# Patient Record
Sex: FEMALE | Race: WHITE | NOT HISPANIC OR LATINO | Employment: FULL TIME
[De-identification: names, ages, dates, MRNs, and addresses within clinical notes are randomized per-mention and may not be internally consistent; named-entity substitution may affect disease eponyms.]

---

## 2017-12-15 ENCOUNTER — RECORDS - HEALTHEAST (OUTPATIENT)
Dept: ADMINISTRATIVE | Facility: OTHER | Age: 32
End: 2017-12-15

## 2017-12-15 ENCOUNTER — OFFICE VISIT - HEALTHEAST (OUTPATIENT)
Dept: FAMILY MEDICINE | Facility: CLINIC | Age: 32
End: 2017-12-15

## 2017-12-15 DIAGNOSIS — J06.9 UPPER RESPIRATORY INFECTION: ICD-10-CM

## 2017-12-15 DIAGNOSIS — J02.9 SORE THROAT: ICD-10-CM

## 2021-05-31 VITALS — WEIGHT: 213 LBS | BODY MASS INDEX: 33.36 KG/M2

## 2021-06-14 NOTE — PROGRESS NOTES
Name: Nevaeh Slaughter  Age: 32 y.o.  Gender: female  : 1985  Date of Encounter: 12/15/2017      HPI:  Nevaeh Slaughter is a 32 y.o.  female who presents to the clinic with concerns of upper respiratory infection.  Reports symptoms started 2 days earlier with fatigue, runny nose, sore throat and productive cough.  Denies fever, chills, ear pain, face pain, face pressure, wheezing, shortness of breath, nausea, vomiting, abdominal pain, diarrhea and skin rash.  Patient lives with her fiancé who is currently not ill.  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    Current Medication:   Medications reviewed and updated.  No current outpatient prescriptions on file.    Past Med / Surg History:  No past medical history on file.  No past surgical history on file.    Fam / Soc History:  No family history on file.  Social History     Social History     Marital status: Single     Spouse name: N/A     Number of children: N/A     Years of education: N/A     Occupational History     Not on file.     Social History Main Topics     Smoking status: Never Smoker     Smokeless tobacco: Not on file     Alcohol use Not on file     Drug use: Not on file     Sexual activity: Not on file     Other Topics Concern     Not on file     Social History Narrative     No narrative on file       ROS:  14 point review of systems unremarkable except as mentioned in HPI    Objective:  Vitals: /74 (Patient Site: Left Arm, Patient Position: Sitting, Cuff Size: Adult Large)  Pulse (!) 102  Temp 98.2  F (36.8  C) (Tympanic)   Wt 213 lb (96.6 kg)  SpO2 97%    Gen: Alert, awake, well appearing  HEENT: NC, AT,   Ears:  Ear canals clear.  TMs normal appearing.  Eyes:  EOMI.  Pupils equally round and reactive to light. Conjunctivae clear.  Sclera non-icteric.  Nose:  Nasal mucosa boggy with clear rhinorrhea, septum midline.  No sinus tenderness  Mouth:  MMM. Oropharynx  erythematous. No tonsillar exudate. Teeth, gum, tongue and lips clear.  Neck:  Supple, FROM, No lymphandenpathy, No TM  Heart: Regular rate and rhythm; normal S1 and S2; no murmurs, gallops, or rubs.  Peripheral Vessels: Normal pulses and perfusion.  Lungs: Unlabored respirations; symmetric chest expansion; clear breath sounds.  Extremities: No clubbing, cyanosis, or edema. Normal upper and lower extremities.  Skin: Normal turgor and without lesions or rashes.  Mental Status: Alert, oriented, in no distress. Mood and affect appropriate.    Pertinent results / imaging:  Results for orders placed or performed in visit on 12/15/17   Rapid Strep A Screen-Throat   Result Value Ref Range    Rapid Strep A Antigen No Group A Strep detected, presumptive negative No Group A Strep detected, presumptive negative       Assessment:  Upper respiratory infection     Plan: Reviewed with patient during clinic visit negative RST and informed follow-up PCR strep test is pending.  If follow-up PCR strep test is negative most probably viral illness.  Informed she will be contacted if the test is positive.  Advised fluids, rest, acetaminophen or ibuprofen for fever or discomfort, humidified air, sleeping with head elevated, salt water gargles, over-the-counter throat lozenges and other medications for symptom control.  Reviewed expected course, duration of symptoms and reasons to return for reevaluation.  Patient voiced understanding and was in agreement with plan.    Alejandrina Esteban MD  12/15/2017

## 2022-01-14 ENCOUNTER — E-VISIT (OUTPATIENT)
Dept: URGENT CARE | Facility: URGENT CARE | Age: 37
End: 2022-01-14
Payer: COMMERCIAL

## 2022-01-14 DIAGNOSIS — J01.90 ACUTE BACTERIAL SINUSITIS: Primary | ICD-10-CM

## 2022-01-14 DIAGNOSIS — B96.89 ACUTE BACTERIAL SINUSITIS: Primary | ICD-10-CM

## 2022-01-14 PROCEDURE — 99421 OL DIG E/M SVC 5-10 MIN: CPT | Performed by: PHYSICIAN ASSISTANT

## 2022-01-14 NOTE — PATIENT INSTRUCTIONS
Dear Nevaeh Marshall    After reviewing your responses, I've been able to diagnose you with?a sinus infection caused by bacteria.?     Based on your responses and diagnosis, I have prescribed augmentin to treat your symptoms. I have sent this to your pharmacy.?     It is also important to stay well hydrated, get lots of rest and take over-the-counter decongestants,?tylenol?or ibuprofen if you?are able to?take those medications per your primary care provider to help relieve discomfort.?     It is important that you take?all of?your prescribed medication even if your symptoms are improving after a few doses.? Taking?all of?your medicine helps prevent the symptoms from returning.?     If your symptoms worsen, you develop severe headache, vomiting, high fever (>102), or are not improving in 7 days, please contact your primary care provider for an appointment or visit any of our convenient Walk-in Care or Urgent Care Centers to be seen which can be found on our website?here.?     Thanks again for choosing?us?as your health care partner,?   ?  Niles June PA-C?

## 2022-01-24 ENCOUNTER — E-VISIT (OUTPATIENT)
Dept: URGENT CARE | Facility: CLINIC | Age: 37
End: 2022-01-24
Payer: COMMERCIAL

## 2022-01-24 DIAGNOSIS — J34.89 SINUS PAIN: Primary | ICD-10-CM

## 2022-01-24 NOTE — PATIENT INSTRUCTIONS
Dear Nevaeh Marshall,    We are sorry you are not feeling well. Based on the responses you provided, it is recommended that you be seen in-person in urgent care so we can better evaluate your symptoms. Please click here to find the nearest urgent care location to you.   You will not be charged for this Visit. Thank you for trusting us with your care.    Linda Mai MD

## 2022-03-13 ENCOUNTER — HEALTH MAINTENANCE LETTER (OUTPATIENT)
Age: 37
End: 2022-03-13

## 2022-04-08 ENCOUNTER — OFFICE VISIT (OUTPATIENT)
Dept: FAMILY MEDICINE | Facility: CLINIC | Age: 37
End: 2022-04-08
Payer: COMMERCIAL

## 2022-04-08 VITALS
WEIGHT: 240 LBS | DIASTOLIC BLOOD PRESSURE: 90 MMHG | HEIGHT: 67 IN | TEMPERATURE: 98.1 F | BODY MASS INDEX: 37.67 KG/M2 | SYSTOLIC BLOOD PRESSURE: 122 MMHG | RESPIRATION RATE: 18 BRPM | OXYGEN SATURATION: 97 % | HEART RATE: 90 BPM

## 2022-04-08 DIAGNOSIS — F41.9 ANXIETY: Primary | ICD-10-CM

## 2022-04-08 LAB
ERYTHROCYTE [DISTWIDTH] IN BLOOD BY AUTOMATED COUNT: 13.1 % (ref 10–15)
HCT VFR BLD AUTO: 43.7 % (ref 35–47)
HGB BLD-MCNC: 14.4 G/DL (ref 11.7–15.7)
MCH RBC QN AUTO: 28.1 PG (ref 26.5–33)
MCHC RBC AUTO-ENTMCNC: 33 G/DL (ref 31.5–36.5)
MCV RBC AUTO: 85 FL (ref 78–100)
PLATELET # BLD AUTO: 209 10E3/UL (ref 150–450)
RBC # BLD AUTO: 5.12 10E6/UL (ref 3.8–5.2)
WBC # BLD AUTO: 5.1 10E3/UL (ref 4–11)

## 2022-04-08 PROCEDURE — 80053 COMPREHEN METABOLIC PANEL: CPT | Performed by: PHYSICIAN ASSISTANT

## 2022-04-08 PROCEDURE — 99204 OFFICE O/P NEW MOD 45 MIN: CPT | Performed by: PHYSICIAN ASSISTANT

## 2022-04-08 PROCEDURE — 84443 ASSAY THYROID STIM HORMONE: CPT | Performed by: PHYSICIAN ASSISTANT

## 2022-04-08 PROCEDURE — 36415 COLL VENOUS BLD VENIPUNCTURE: CPT | Performed by: PHYSICIAN ASSISTANT

## 2022-04-08 PROCEDURE — 85027 COMPLETE CBC AUTOMATED: CPT | Performed by: PHYSICIAN ASSISTANT

## 2022-04-08 RX ORDER — ESCITALOPRAM OXALATE 10 MG/1
10 TABLET ORAL DAILY
Qty: 30 TABLET | Refills: 1 | Status: SHIPPED | OUTPATIENT
Start: 2022-04-08 | End: 2022-04-27

## 2022-04-08 ASSESSMENT — ENCOUNTER SYMPTOMS: NERVOUS/ANXIOUS: 1

## 2022-04-08 NOTE — PROGRESS NOTES
"  Assessment & Plan     Anxiety  Will start taking in am, 1/2 tablets for 4-6 days until no side effects noted then increase to whole tab and follow-up in 1 month    - Comprehensive metabolic panel  - TSH with free T4 reflex  - CBC with platelets  - escitalopram (LEXAPRO) 10 MG tablet; Take 1 tablet (10 mg) by mouth daily      BMI:   Estimated body mass index is 37.59 kg/m  as calculated from the following:    Height as of this encounter: 1.702 m (5' 7\").    Weight as of this encounter: 108.9 kg (240 lb).           Return in about 3 weeks (around 4/29/2022) for depression/anxiety recheck.    Ramona Ann Aaseby-Aguilera, PA-C  [facility] CRISTINA Nair is a 36 year old who presents for the following health issues     Anxiety    History of Present Illness       Hypertension: She presents for follow up of hypertension.  She does not check blood pressure  regularly outside of the clinic. Outpatient blood pressures have not been over 140/90. She does not follow a low salt diet.     Reason for visit:  Wenesday I started to feel lighted headed. And feeling really anxious.  Symptom onset:  1-3 days ago    She eats 0-1 servings of fruits and vegetables daily.She consumes 2 sweetened beverage(s) daily.She exercises with enough effort to increase her heart rate 9 or less minutes per day.  She exercises with enough effort to increase her heart rate 3 or less days per week.   She is taking medications regularly.             Review of Systems   Psychiatric/Behavioral: The patient is nervous/anxious.             Objective    BP (!) 122/90   Pulse 90   Temp 98.1  F (36.7  C) (Oral)   Resp 18   Ht 1.702 m (5' 7\")   Wt 108.9 kg (240 lb)   LMP 03/21/2022 (Approximate)   SpO2 97%   BMI 37.59 kg/m    Body mass index is 37.59 kg/m .  Physical Exam   GENERAL: healthy, alert and no distress  HENT: ear canals and TM's normal, nose and mouth without ulcers or lesions  RESP: lungs clear to auscultation - " no rales, rhonchi or wheezes  CV: regular rate and rhythm, normal S1 S2, no S3 or S4, no murmur, click or rub, no peripheral edema and peripheral pulses strong  PSYCH: mentation appears normal, affect normal/bright

## 2022-04-08 NOTE — PATIENT INSTRUCTIONS
(F41.9) Anxiety  (primary encounter diagnosis)  Comment: ? Etiology but most likley anxiety.  Well check thyroid and other labs and determine plan.  Follow-up 3-4 weeks   Plan: Comprehensive metabolic panel, TSH with free T4        reflex, CBC with platelets, escitalopram         (LEXAPRO) 10 MG tablet              Patient Education     Treating Anxiety Disorders with Medicine  An anxiety disorder can make you feel nervous or apprehensive, even without a clear reason. In people age 65 and older, generalized anxiety disorder is one of the most commonly diagnosed anxiety disorders. Many times it occurs with depression. Certain anxiety disorders can cause intense feelings of fear or panic. You may even have physical symptoms such as a racing heartbeat, sweating, or dizziness. If you have these feelings, you don t have to suffer anymore. Treatment to help you overcome your fears will likely include therapy (also called counseling). Medicine may also be prescribed to help control your symptoms.     Medicines  Certain medicines may be prescribed to help control your symptoms. So you may feel less anxious. You may also feel able to move forward with therapy. At first, medicines and dosages may need to be adjusted to find what works best for you. Try to be patient. Tell your healthcare provider how a medicine makes you feel. This way, you can work together to find the treatment that s best for you. Keep in mind that medicines can have side effects. Talk with your provider about any side effects that are bothering you. Changing the dose or type of medicine may help. Don t stop taking medicine on your own. That can cause symptoms to come back or cause dangerous withdrawal symptoms.     Anti-anxiety medicine. This medicine eases symptoms and helps you relax. Your healthcare provider will explain when and how to use it. It may be prescribed for use before situations that make you anxious. You may also be told to take medicine  on a regular schedule. Anti-anxiety medicine may make you feel a little sleepy or  out of it.  Don t drive a car or operate machinery while on this medicine, until you know how it affects you.  Never use alcohol or other drugs with anti-anxiety medicines. This could result in loss of muscular control, sedation, coma, or death. Also, use only the amount of medicine prescribed for you. If you think you may have taken too much, get emergency care right away. Never share your medicines with others. Store these medicines in a safe place that can't be reached by children or visitors.   Keep taking medicines as prescribed  Never change your dosage, share or use another person's medicine, or stop taking your medicines without talking to your healthcare provider first. Keep the following in mind:     Some medicines must be taken on a schedule. Make this part of your daily routine. For instance, always take your pill before brushing your teeth. A pillbox can help you remember if you ve taken your medicine each day.    Medicines are often taken for 6 to 12 months. Your healthcare provider will then evaluate whether you need to stay on them. Many people who have also had therapy may no longer need medicine to manage anxiety.    You may need to stop taking medicine slowly to give your body time to adjust. When it s time to stop, your healthcare provider will tell you more. Remember: Never stop taking your medicine without talking to your provider first.    If symptoms return, you may need to start taking medicines again.  This isn t your fault. It s just the nature of your anxiety disorder.  What to think about    Side effects. Medicines may cause side effects. Ask your healthcare provider or pharmacist what you can expect. They may have ideas for avoiding some side effects.    Sexual problems. Some antidepressants can affect your desire for sex or your ability to have an orgasm. A change in dosage or medicine often solves the  problem. If you have a sexual side effect that concerns you, tell your healthcare provider.    Addiction. If you ve never had a problem with drugs or alcohol, you may not have a problem with medicines used to treat anxiety disorders. But always discuss the medicines with your healthcare provider before taking them. If you have a history of addiction, you may not be able to use certain medicines used to treat anxiety disorders.    Medicine interactions. Always check with your pharmacist before using any over-the-counter medicines (OTCs), including herbal supplements. Some OTCs may interact with your anti-anxiety medicines and increase or decrease their effectiveness.    [employer] last reviewed this educational content on 12/1/2019 2000-2021 [employer]. All rights reserved. This information is not intended as a substitute for professional medical care. Always follow your healthcare professional's instructions.           Patient Education     Lexapro Oral Tablet 10 mg  Uses  This medicine is used for the following purposes:    anxiety    depression    eating disorders    menopausal symptoms    obsessive compulsive disorder    post-traumatic stress disorder  Instructions  This medicine may be taken with or without food.  It is very important that you take the medicine at about the same time every day. It will work best if you do this.  Keep the medicine at room temperature. Avoid heat and direct light.  It may take several weeks for this medicine to fully work.  It is important that you keep taking each dose of this medicine on time even if you are feeling well.  If you forget to take a dose on time, take it as soon as you remember. If it is almost time for the next dose, do not take the missed dose. Return to your normal dosing schedule. Do not take 2 doses of this medicine at one time.  Please tell your doctor and pharmacist about all the medicines you take. Include both prescription and  over-the-counter medicines. Also tell them about any vitamins, herbal medicines, or anything else you take for your health.  Do not suddenly stop taking this medicine. Check with your doctor before stopping.  Cautions  Tell your doctor and pharmacist if you ever had an allergic reaction to a medicine. Symptoms of an allergic reaction can include trouble breathing, skin rash, itching, swelling, or severe dizziness.  Do not use the medication any more than instructed.  Your ability to stay alert or to react quickly may be impaired by this medicine. Do not drive or operate machinery until you know how this medicine will affect you.  Do not drink beverages with alcohol while on this medicine.  Family should check on the patient often. Call the doctor if patient becomes more depressed, has thoughts of suicide, or shows changes in behavior.  Call the doctor if there are any signs of confusion or unusual changes in behavior.  Tell the doctor or pharmacist if you are pregnant, planning to be pregnant, or breastfeeding.  Ask your pharmacist if this medicine can interact with any of your other medicines. Be sure to tell them about all the medicines you take.  Do not start or stop any other medicines without first speaking to your doctor or pharmacist.  Do not share this medicine with anyone who has not been prescribed this medicine.  This medicine can cause serious side effects in some patients. Important information from the [place] Food and Drug Administration (FDA) is available from your pharmacist. Please review it carefully with your pharmacist to understand the risks associated with this medicine.  Side Effects  The following is a list of some common side effects from this medicine. Please speak with your doctor about what you should do if you experience these or other side effects.    agitated feeling or trouble sleeping    constipation    dizziness    drowsiness or sedation    dry mouth    lack of energy and  tiredness    nausea    stomach upset or abdominal pain    sweating    vomiting  Call your doctor or get medical help right away if you notice any of these more serious side effects:    bleeding that is severe or takes longer to stop    confusion    severe or persistent constipation    pain in the eye    fainting    hallucinations (unusual thoughts, seeing or hearing things that are not real)    fast or irregular heart beats    muscle aches, spasms or abnormal movements    muscle weakness    dilation of the pupils    problems with sexual functions or desire    blood in stool    dark, tarry stool    blurring or changes of vision    severe or persistent vomiting  A few people may have an allergic reactions to this medicine. Symptoms can include difficulty breathing, skin rash, itching, swelling, or severe dizziness. If you notice any of these symptoms, seek medical help quickly.  Extra  Please speak with your doctor, nurse, or pharmacist if you have any questions about this medicine.  [email or web address]  IMPORTANT NOTE: This document tells you briefly how to take your medicine, but it does not tell you all there is to know about it.Your doctor or pharmacist may give you other documents about your medicine. Please talk to them if you have any questions.Always follow their advice. There is a more complete description of this medicine available in English.Scan this code on your smartphone or tablet or use the web address below. You can also ask your pharmacist for a printout. If you have any questions, please ask your pharmacist.     2021 [employer].

## 2022-04-09 LAB
ALBUMIN SERPL-MCNC: 3.9 G/DL (ref 3.4–5)
ALP SERPL-CCNC: 101 U/L (ref 40–150)
ALT SERPL W P-5'-P-CCNC: 45 U/L (ref 0–50)
ANION GAP SERPL CALCULATED.3IONS-SCNC: 5 MMOL/L (ref 3–14)
AST SERPL W P-5'-P-CCNC: 21 U/L (ref 0–45)
BILIRUB SERPL-MCNC: 0.2 MG/DL (ref 0.2–1.3)
BUN SERPL-MCNC: 10 MG/DL (ref 7–30)
CALCIUM SERPL-MCNC: 9.4 MG/DL (ref 8.5–10.1)
CHLORIDE BLD-SCNC: 107 MMOL/L (ref 94–109)
CO2 SERPL-SCNC: 25 MMOL/L (ref 20–32)
CREAT SERPL-MCNC: 0.79 MG/DL (ref 0.52–1.04)
GFR SERPL CREATININE-BSD FRML MDRD: >90 ML/MIN/1.73M2
GLUCOSE BLD-MCNC: 91 MG/DL (ref 70–99)
POTASSIUM BLD-SCNC: 3.9 MMOL/L (ref 3.4–5.3)
PROT SERPL-MCNC: 8 G/DL (ref 6.8–8.8)
SODIUM SERPL-SCNC: 137 MMOL/L (ref 133–144)
TSH SERPL DL<=0.005 MIU/L-ACNC: 1.15 MU/L (ref 0.4–4)

## 2022-04-27 ENCOUNTER — OFFICE VISIT (OUTPATIENT)
Dept: FAMILY MEDICINE | Facility: CLINIC | Age: 37
End: 2022-04-27
Payer: COMMERCIAL

## 2022-04-27 VITALS
SYSTOLIC BLOOD PRESSURE: 132 MMHG | TEMPERATURE: 98.2 F | DIASTOLIC BLOOD PRESSURE: 93 MMHG | RESPIRATION RATE: 14 BRPM | HEIGHT: 67 IN | HEART RATE: 86 BPM | BODY MASS INDEX: 37.51 KG/M2 | WEIGHT: 239 LBS

## 2022-04-27 DIAGNOSIS — Z13.220 SCREENING, LIPID: ICD-10-CM

## 2022-04-27 DIAGNOSIS — Z11.59 NEED FOR HEPATITIS C SCREENING TEST: ICD-10-CM

## 2022-04-27 DIAGNOSIS — F41.9 ANXIETY: Primary | ICD-10-CM

## 2022-04-27 LAB
CHOLEST SERPL-MCNC: 200 MG/DL
FASTING STATUS PATIENT QL REPORTED: ABNORMAL
HDLC SERPL-MCNC: 51 MG/DL
LDLC SERPL CALC-MCNC: 127 MG/DL
NONHDLC SERPL-MCNC: 149 MG/DL
TRIGL SERPL-MCNC: 111 MG/DL

## 2022-04-27 PROCEDURE — 86803 HEPATITIS C AB TEST: CPT | Performed by: PHYSICIAN ASSISTANT

## 2022-04-27 PROCEDURE — 99213 OFFICE O/P EST LOW 20 MIN: CPT | Performed by: PHYSICIAN ASSISTANT

## 2022-04-27 PROCEDURE — 36415 COLL VENOUS BLD VENIPUNCTURE: CPT | Performed by: PHYSICIAN ASSISTANT

## 2022-04-27 PROCEDURE — 80061 LIPID PANEL: CPT | Performed by: PHYSICIAN ASSISTANT

## 2022-04-27 ASSESSMENT — ANXIETY QUESTIONNAIRES
GAD7 TOTAL SCORE: 1
2. NOT BEING ABLE TO STOP OR CONTROL WORRYING: NOT AT ALL
7. FEELING AFRAID AS IF SOMETHING AWFUL MIGHT HAPPEN: NOT AT ALL
GAD7 TOTAL SCORE: 1
1. FEELING NERVOUS, ANXIOUS, OR ON EDGE: NOT AT ALL
7. FEELING AFRAID AS IF SOMETHING AWFUL MIGHT HAPPEN: NOT AT ALL
GAD7 TOTAL SCORE: 1
3. WORRYING TOO MUCH ABOUT DIFFERENT THINGS: NOT AT ALL
6. BECOMING EASILY ANNOYED OR IRRITABLE: SEVERAL DAYS
5. BEING SO RESTLESS THAT IT IS HARD TO SIT STILL: NOT AT ALL
4. TROUBLE RELAXING: NOT AT ALL

## 2022-04-27 NOTE — PROGRESS NOTES
"  Assessment & Plan     Anxiety  Did not start on lexapro but made healthy life style choices and feels this is helping. Advised follow-up for physical in 1-2 months and well readdress then.     Need for hepatitis C screening test    - Hepatitis C Screen Reflex to HCV RNA Quant and Genotype    Screening, lipid    - Lipid Profile (Chol, Trig, HDL, LDL calc)             BMI:   Estimated body mass index is 37.43 kg/m  as calculated from the following:    Height as of this encounter: 1.702 m (5' 7\").    Weight as of this encounter: 108.4 kg (239 lb).   Weight management plan: Discussed healthy diet and exercise guidelines        Return in about 2 months (around 6/27/2022) for Physical Exam, PAP.    Ramona Ann Aaseby-Aguilera, PA-C  [facility]    Parveen Nair is a 36 year old who presents for the following health issues         Was seen for anxiety 1 month ago and was prescribed lexapro.  She states she did not end up taking it and has been making life style changes like exercise and eating healthy and this has been helping.   History of Present Illness       Mental Health Follow-up:  Patient presents to follow-up on Anxiety.    Patient's anxiety since last visit has been:  Better  The patient is not having other symptoms associated with anxiety.  Any significant life events: No and job concerns  Patient is not feeling anxious or having panic attacks.  Patient has no concerns about alcohol or drug use.         Today's PEDRO-7 Score: 1    Hypertension: She presents for follow up of hypertension.  She does not check blood pressure  regularly outside of the clinic. Outpatient blood pressures have not been over 140/90. She follows a low salt diet.     Reason for visit:  Follow up appt from paul week ago.  Symptom onset:  1-2 weeks ago  Symptom intensity:  Mild  Symptom progression:  Improving  Had these symptoms before:  No    She eats 0-1 servings of fruits and vegetables daily.She consumes 0 " "sweetened beverage(s) daily.She exercises with enough effort to increase her heart rate 9 or less minutes per day.  She exercises with enough effort to increase her heart rate 3 or less days per week.   She is taking medications regularly.           Review of Systems   Constitutional, HEENT, cardiovascular, pulmonary, gi and gu systems are negative, except as otherwise noted.      Objective    BP (!) 132/93 (BP Location: Right arm, Patient Position: Chair, Cuff Size: Adult Large)   Pulse 86   Temp 98.2  F (36.8  C) (Oral)   Resp 14   Ht 1.702 m (5' 7\")   Wt 108.4 kg (239 lb)   LMP 03/21/2022 (Approximate)   Breastfeeding No   BMI 37.43 kg/m    Body mass index is 37.43 kg/m .  Physical Exam   GENERAL: healthy, alert and no distress  PSYCH: mentation appears normal, affect normal/bright                "

## 2022-04-28 LAB — HCV AB SERPL QL IA: NONREACTIVE

## 2022-04-28 ASSESSMENT — ANXIETY QUESTIONNAIRES: GAD7 TOTAL SCORE: 1

## 2022-06-30 SDOH — ECONOMIC STABILITY: INCOME INSECURITY: HOW HARD IS IT FOR YOU TO PAY FOR THE VERY BASICS LIKE FOOD, HOUSING, MEDICAL CARE, AND HEATING?: NOT HARD AT ALL

## 2022-06-30 SDOH — ECONOMIC STABILITY: INCOME INSECURITY: IN THE LAST 12 MONTHS, WAS THERE A TIME WHEN YOU WERE NOT ABLE TO PAY THE MORTGAGE OR RENT ON TIME?: NO

## 2022-06-30 SDOH — ECONOMIC STABILITY: TRANSPORTATION INSECURITY
IN THE PAST 12 MONTHS, HAS LACK OF TRANSPORTATION KEPT YOU FROM MEETINGS, WORK, OR FROM GETTING THINGS NEEDED FOR DAILY LIVING?: NO

## 2022-06-30 SDOH — ECONOMIC STABILITY: FOOD INSECURITY: WITHIN THE PAST 12 MONTHS, THE FOOD YOU BOUGHT JUST DIDN'T LAST AND YOU DIDN'T HAVE MONEY TO GET MORE.: NEVER TRUE

## 2022-06-30 SDOH — HEALTH STABILITY: PHYSICAL HEALTH: ON AVERAGE, HOW MANY MINUTES DO YOU ENGAGE IN EXERCISE AT THIS LEVEL?: 10 MIN

## 2022-06-30 SDOH — ECONOMIC STABILITY: FOOD INSECURITY: WITHIN THE PAST 12 MONTHS, YOU WORRIED THAT YOUR FOOD WOULD RUN OUT BEFORE YOU GOT MONEY TO BUY MORE.: NEVER TRUE

## 2022-06-30 SDOH — ECONOMIC STABILITY: TRANSPORTATION INSECURITY
IN THE PAST 12 MONTHS, HAS THE LACK OF TRANSPORTATION KEPT YOU FROM MEDICAL APPOINTMENTS OR FROM GETTING MEDICATIONS?: NO

## 2022-06-30 SDOH — HEALTH STABILITY: PHYSICAL HEALTH: ON AVERAGE, HOW MANY DAYS PER WEEK DO YOU ENGAGE IN MODERATE TO STRENUOUS EXERCISE (LIKE A BRISK WALK)?: 3 DAYS

## 2022-06-30 ASSESSMENT — ENCOUNTER SYMPTOMS
PARESTHESIAS: 0
DIARRHEA: 0
NERVOUS/ANXIOUS: 0
SHORTNESS OF BREATH: 0
FREQUENCY: 0
SORE THROAT: 0
DIZZINESS: 0
COUGH: 0
CHILLS: 0
EYE PAIN: 0
PALPITATIONS: 0
MYALGIAS: 0
FEVER: 0
BREAST MASS: 0
WEAKNESS: 0
HEADACHES: 0
ARTHRALGIAS: 0
HEMATURIA: 0
ABDOMINAL PAIN: 0
HEARTBURN: 0
NAUSEA: 0
HEMATOCHEZIA: 0
JOINT SWELLING: 0
DYSURIA: 0
CONSTIPATION: 0

## 2022-06-30 ASSESSMENT — LIFESTYLE VARIABLES
AUDIT-C TOTAL SCORE: 1
SKIP TO QUESTIONS 9-10: 1
HOW OFTEN DO YOU HAVE A DRINK CONTAINING ALCOHOL: MONTHLY OR LESS
HOW MANY STANDARD DRINKS CONTAINING ALCOHOL DO YOU HAVE ON A TYPICAL DAY: 1 OR 2
HOW OFTEN DO YOU HAVE SIX OR MORE DRINKS ON ONE OCCASION: NEVER

## 2022-06-30 ASSESSMENT — SOCIAL DETERMINANTS OF HEALTH (SDOH)
IN A TYPICAL WEEK, HOW MANY TIMES DO YOU TALK ON THE PHONE WITH FAMILY, FRIENDS, OR NEIGHBORS?: MORE THAN THREE TIMES A WEEK
HOW OFTEN DO YOU ATTEND CHURCH OR RELIGIOUS SERVICES?: NEVER
HOW OFTEN DO YOU GET TOGETHER WITH FRIENDS OR RELATIVES?: ONCE A WEEK
DO YOU BELONG TO ANY CLUBS OR ORGANIZATIONS SUCH AS CHURCH GROUPS UNIONS, FRATERNAL OR ATHLETIC GROUPS, OR SCHOOL GROUPS?: NO

## 2022-07-01 ENCOUNTER — OFFICE VISIT (OUTPATIENT)
Dept: FAMILY MEDICINE | Facility: CLINIC | Age: 37
End: 2022-07-01
Payer: COMMERCIAL

## 2022-07-01 VITALS
DIASTOLIC BLOOD PRESSURE: 92 MMHG | HEART RATE: 99 BPM | SYSTOLIC BLOOD PRESSURE: 144 MMHG | WEIGHT: 234 LBS | HEIGHT: 66 IN | RESPIRATION RATE: 14 BRPM | TEMPERATURE: 97.3 F | BODY MASS INDEX: 37.61 KG/M2

## 2022-07-01 DIAGNOSIS — Z12.4 CERVICAL CANCER SCREENING: ICD-10-CM

## 2022-07-01 DIAGNOSIS — I10 ESSENTIAL HYPERTENSION: Primary | ICD-10-CM

## 2022-07-01 DIAGNOSIS — Z00.00 ROUTINE GENERAL MEDICAL EXAMINATION AT A HEALTH CARE FACILITY: ICD-10-CM

## 2022-07-01 PROCEDURE — 99395 PREV VISIT EST AGE 18-39: CPT | Performed by: PHYSICIAN ASSISTANT

## 2022-07-01 PROCEDURE — 87624 HPV HI-RISK TYP POOLED RSLT: CPT | Performed by: PHYSICIAN ASSISTANT

## 2022-07-01 PROCEDURE — G0145 SCR C/V CYTO,THINLAYER,RESCR: HCPCS | Performed by: PHYSICIAN ASSISTANT

## 2022-07-01 PROCEDURE — 99214 OFFICE O/P EST MOD 30 MIN: CPT | Mod: 25 | Performed by: PHYSICIAN ASSISTANT

## 2022-07-01 RX ORDER — LISINOPRIL 10 MG/1
10 TABLET ORAL DAILY
Qty: 30 TABLET | Refills: 1 | Status: SHIPPED | OUTPATIENT
Start: 2022-07-01 | End: 2022-07-29

## 2022-07-01 ASSESSMENT — ENCOUNTER SYMPTOMS
HEARTBURN: 0
SHORTNESS OF BREATH: 0
BREAST MASS: 0
JOINT SWELLING: 0
PARESTHESIAS: 0
DYSURIA: 0
HEADACHES: 0
COUGH: 0
CONSTIPATION: 0
EYE PAIN: 0
ABDOMINAL PAIN: 0
PALPITATIONS: 0
DIZZINESS: 0
FEVER: 0
SORE THROAT: 0
MYALGIAS: 0
HEMATOCHEZIA: 0
WEAKNESS: 0
ARTHRALGIAS: 0
DIARRHEA: 0
FREQUENCY: 0
HEMATURIA: 0
NAUSEA: 0
CHILLS: 0
NERVOUS/ANXIOUS: 0

## 2022-07-01 NOTE — PROGRESS NOTES
SUBJECTIVE:   CC: Nevaeh Marshall is an 36 year old woman who presents for preventive health visit.       Patient has been advised of split billing requirements and indicates understanding: Yes  Healthy Habits:     Getting at least 3 servings of Calcium per day:  Yes    Bi-annual eye exam:  Yes    Dental care twice a year:  Yes    Sleep apnea or symptoms of sleep apnea:  None    Diet:  Carbohydrate counting    Frequency of exercise:  None    Taking medications regularly:  Yes    Medication side effects:  None    PHQ-2 Total Score: 0    Additional concerns today:  No          Today's PHQ-2 Score:   PHQ-2 ( 1999 [employer]) 6/30/2022   Q1: Little interest or pleasure in doing things 0   Q2: Feeling down, depressed or hopeless 0   PHQ-2 Score 0   Q1: Little interest or pleasure in doing things Not at all   Q2: Feeling down, depressed or hopeless Not at all   PHQ-2 Score 0       Abuse: Current or Past (Physical, Sexual or Emotional) - No  Do you feel safe in your environment? Yes    Have you ever done Advance Care Planning? (For example, a Health Directive, POLST, or a discussion with a medical provider or your loved ones about your wishes): No, advance care planning information given to patient to review.  Patient plans to discuss their wishes with loved ones or provider.      Social History     Tobacco Use     Smoking status: Never Smoker     Smokeless tobacco: Never Used   Substance Use Topics     Alcohol use: Yes         Alcohol Use 6/30/2022   Prescreen: >3 drinks/day or >7 drinks/week? No       Reviewed orders with patient.  Reviewed health maintenance and updated orders accordingly - Yes  BP Readings from Last 3 Encounters:   07/01/22 (!) 144/92   04/27/22 (!) 132/93   04/08/22 (!) 122/90    Wt Readings from Last 3 Encounters:   07/01/22 106.1 kg (234 lb)   04/27/22 108.4 kg (239 lb)   04/08/22 108.9 kg (240 lb)                  Recent Labs   Lab Test 04/27/22  1023 04/08/22  1700   *  --    HDL 51  --  "   TRIG 111  --    ALT  --  45   CR  --  0.79   GFRESTIMATED  --  >90   POTASSIUM  --  3.9   TSH  --  1.15        Breast Cancer Screening:    Breast CA Risk Assessment (FHS-7) 6/30/2022   Do you have a family history of breast, colon, or ovarian cancer? No / Unknown       click delete button to remove this line now  Patient under 40 years of age: Routine Mammogram Screening not recommended.   Pertinent mammograms are reviewed under the imaging tab.    History of abnormal Pap smear: NO - age 30- 65 PAP every 3 years recommended     Reviewed and updated as needed this visit by clinical staff   Tobacco  Allergies    Med Hx  Surg Hx  Fam Hx  Soc Hx          Reviewed and updated as needed this visit by Provider                       Review of Systems   Constitutional: Negative for chills and fever.   HENT: Negative for congestion, ear pain, hearing loss and sore throat.    Eyes: Negative for pain and visual disturbance.   Respiratory: Negative for cough and shortness of breath.    Cardiovascular: Negative for chest pain, palpitations and peripheral edema.   Gastrointestinal: Negative for abdominal pain, constipation, diarrhea, heartburn, hematochezia and nausea.   Breasts:  Negative for tenderness, breast mass and discharge.   Genitourinary: Negative for dysuria, frequency, genital sores, hematuria, pelvic pain, urgency, vaginal bleeding and vaginal discharge.   Musculoskeletal: Negative for arthralgias, joint swelling and myalgias.   Skin: Negative for rash.   Neurological: Negative for dizziness, weakness, headaches and paresthesias.   Psychiatric/Behavioral: Negative for mood changes. The patient is not nervous/anxious.           OBJECTIVE:   BP (!) 144/92 (BP Location: Right arm, Patient Position: Sitting, Cuff Size: Adult Large)   Pulse 99   Temp 97.3  F (36.3  C) (Oral)   Resp 14   Ht 1.67 m (5' 5.75\")   Wt 106.1 kg (234 lb)   LMP 06/07/2022 (Approximate)   Breastfeeding No   BMI 38.06 kg/m    Physical " Exam  GENERAL: healthy, alert and no distress  EYES: Eyes grossly normal to inspection, PERRL and conjunctivae and sclerae normal  HENT: ear canals and TM's normal, nose and mouth without ulcers or lesions  NECK: no adenopathy, no asymmetry, masses, or scars and thyroid normal to palpation  RESP: lungs clear to auscultation - no rales, rhonchi or wheezes  BREAST: normal without masses, tenderness or nipple discharge and no palpable axillary masses or adenopathy  CV: regular rate and rhythm, normal S1 S2, no S3 or S4, no murmur, click or rub, no peripheral edema and peripheral pulses strong  ABDOMEN: soft, nontender, no hepatosplenomegaly, no masses and bowel sounds normal   (female): normal female external genitalia, normal urethral meatus, vaginal mucosa pink, moist, well rugated, and normal cervix/adnexa/uterus without masses or discharge  MS: no gross musculoskeletal defects noted, no edema  SKIN: no suspicious lesions or rashes  NEURO: Normal strength and tone, mentation intact and speech normal  PSYCH: mentation appears normal, affect normal/bright    Diagnostic Test Results:  Labs reviewed in Epic    ASSESSMENT/PLAN:   (I10) Essential hypertension  (primary encounter diagnosis)  Comment:   Plan: lisinopril (ZESTRIL) 10 MG tablet            (Z12.4) Cervical cancer screening  Comment:   Plan: Pap Screen with HPV - recommended age 30 - 65         years, HPV Hold (Lab Only)            (Z00.00) Routine general medical examination at a health care facility  Comment:   Plan:       Patient Instructions     (I10) Essential hypertension  (primary encounter diagnosis)  Comment: will start on lisinopril.  Risks, benefits, side effects and intended purposes discussed.     Will have come in for nurse only BP check x 2 and then follow-up for office visit in 1 month with BP diary.      Plan: lisinopril (ZESTRIL) 10 MG tablet                (Z00.00) Routine general medical examination at a health care facility  Comment:  "  Plan:               Preventive Health Recommendations  Female Ages 26 - 39  Yearly exam:   See your health care provider every year in order to    Review health changes.     Discuss preventive care.      Review your medicines if you your doctor has prescribed any.    Until age 30: Get a Pap test every three years (more often if you have had an abnormal result).    After age 30: Talk to your doctor about whether you should have a Pap test every 3 years or have a Pap test with HPV screening every 5 years.   You do not need a Pap test if your uterus was removed (hysterectomy) and you have not had cancer.  You should be tested each year for STDs (sexually transmitted diseases), if you're at risk.   Talk to your provider about how often to have your cholesterol checked.  If you are at risk for diabetes, you should have a diabetes test (fasting glucose).  Shots: Get a flu shot each year. Get a tetanus shot every 10 years.   Nutrition:     Eat at least 5 servings of fruits and vegetables each day.    Eat whole-grain bread, whole-wheat pasta and brown rice instead of white grains and rice.    Get adequate Calcium and Vitamin D.     Lifestyle    Exercise at least 150 minutes a week (30 minutes a day, 5 days of the week). This will help you control your weight and prevent disease.    Limit alcohol to one drink per day.    No smoking.     Wear sunscreen to prevent skin cancer.    See your dentist every six months for an exam and cleaning.          COUNSELING:  Reviewed preventive health counseling, as reflected in patient instructions       Regular exercise       Healthy diet/nutrition       Vision screening          Estimated body mass index is 38.06 kg/m  as calculated from the following:    Height as of this encounter: 1.67 m (5' 5.75\").    Weight as of this encounter: 106.1 kg (234 lb).    Weight management plan: Discussed healthy diet and exercise guidelines    She reports that she has never smoked. She has never used " smokeless tobacco.      Counseling Resources:  ATP IV Guidelines  Pooled Cohorts Equation Calculator  Breast Cancer Risk Calculator  BRCA-Related Cancer Risk Assessment: FHS-7 Tool  FRAX Risk Assessment  ICSI Preventive Guidelines  Dietary Guidelines for Americans, 2010  USDA's MyPlate  ASA Prophylaxis  Lung CA Screening    Ramona Ann Aaseby-Aguilera, PA-C [facility]

## 2022-07-01 NOTE — PATIENT INSTRUCTIONS
(I10) Essential hypertension  (primary encounter diagnosis)  Comment: will start on lisinopril.  Risks, benefits, side effects and intended purposes discussed.     Will have come in for nurse only BP check x 2 and then follow-up for office visit in 1 month with BP diary.      Plan: lisinopril (ZESTRIL) 10 MG tablet                (Z00.00) Routine general medical examination at a health care facility  Comment:   Plan:               Preventive Health Recommendations  Female Ages 26 - 39  Yearly exam:   See your health care provider every year in order to  Review health changes.   Discuss preventive care.    Review your medicines if you your doctor has prescribed any.    Until age 30: Get a Pap test every three years (more often if you have had an abnormal result).    After age 30: Talk to your doctor about whether you should have a Pap test every 3 years or have a Pap test with HPV screening every 5 years.   You do not need a Pap test if your uterus was removed (hysterectomy) and you have not had cancer.  You should be tested each year for STDs (sexually transmitted diseases), if you're at risk.   Talk to your provider about how often to have your cholesterol checked.  If you are at risk for diabetes, you should have a diabetes test (fasting glucose).  Shots: Get a flu shot each year. Get a tetanus shot every 10 years.   Nutrition:   Eat at least 5 servings of fruits and vegetables each day.  Eat whole-grain bread, whole-wheat pasta and brown rice instead of white grains and rice.  Get adequate Calcium and Vitamin D.     Lifestyle  Exercise at least 150 minutes a week (30 minutes a day, 5 days of the week). This will help you control your weight and prevent disease.  Limit alcohol to one drink per day.  No smoking.   Wear sunscreen to prevent skin cancer.  See your dentist every six months for an exam and cleaning.

## 2022-07-07 LAB
BKR LAB AP GYN ADEQUACY: NORMAL
BKR LAB AP GYN INTERPRETATION: NORMAL
BKR LAB AP HPV REFLEX: NORMAL
BKR LAB AP PREVIOUS ABNORMAL: NORMAL
PATH REPORT.COMMENTS IMP SPEC: NORMAL
PATH REPORT.COMMENTS IMP SPEC: NORMAL
PATH REPORT.RELEVANT HX SPEC: NORMAL

## 2022-07-08 ENCOUNTER — ALLIED HEALTH/NURSE VISIT (OUTPATIENT)
Dept: FAMILY MEDICINE | Facility: CLINIC | Age: 37
End: 2022-07-08
Payer: COMMERCIAL

## 2022-07-08 VITALS — DIASTOLIC BLOOD PRESSURE: 80 MMHG | SYSTOLIC BLOOD PRESSURE: 124 MMHG

## 2022-07-08 DIAGNOSIS — I10 ESSENTIAL HYPERTENSION: Primary | ICD-10-CM

## 2022-07-08 PROCEDURE — 99207 PR NO CHARGE NURSE ONLY: CPT

## 2022-07-08 NOTE — PROGRESS NOTES
Nevaeh Marshall is a 36 year old patient who comes in today for a Blood Pressure check.  Initial BP: 124/80 and      Data Unavailable  Disposition: follow-up as previously indicated by provider.    MARTHA Manzano  ..

## 2022-07-11 LAB
HUMAN PAPILLOMA VIRUS 16 DNA: NEGATIVE
HUMAN PAPILLOMA VIRUS 18 DNA: NEGATIVE
HUMAN PAPILLOMA VIRUS FINAL DIAGNOSIS: NORMAL
HUMAN PAPILLOMA VIRUS OTHER HR: NEGATIVE

## 2022-07-18 ENCOUNTER — ALLIED HEALTH/NURSE VISIT (OUTPATIENT)
Dept: FAMILY MEDICINE | Facility: CLINIC | Age: 37
End: 2022-07-18
Payer: COMMERCIAL

## 2022-07-18 VITALS — DIASTOLIC BLOOD PRESSURE: 72 MMHG | SYSTOLIC BLOOD PRESSURE: 116 MMHG

## 2022-07-18 DIAGNOSIS — I10 ESSENTIAL HYPERTENSION: Primary | ICD-10-CM

## 2022-07-18 PROCEDURE — 99207 PR NO CHARGE NURSE ONLY: CPT

## 2022-07-18 NOTE — PROGRESS NOTES
Nevaeh Marshall is a 36 year old patient who comes in today for a Blood Pressure check.  Initial BP:  116/72    Disposition: follow-up as previously indicated by provider  Karina Cespedes, [facility]

## 2022-07-29 ENCOUNTER — OFFICE VISIT (OUTPATIENT)
Dept: FAMILY MEDICINE | Facility: CLINIC | Age: 37
End: 2022-07-29
Payer: COMMERCIAL

## 2022-07-29 VITALS
BODY MASS INDEX: 37.89 KG/M2 | OXYGEN SATURATION: 98 % | RESPIRATION RATE: 14 BRPM | TEMPERATURE: 97.8 F | WEIGHT: 233 LBS | HEART RATE: 94 BPM | SYSTOLIC BLOOD PRESSURE: 116 MMHG | DIASTOLIC BLOOD PRESSURE: 70 MMHG

## 2022-07-29 DIAGNOSIS — I10 ESSENTIAL HYPERTENSION: ICD-10-CM

## 2022-07-29 DIAGNOSIS — Z14.8 CARRIER OF DUCHENNE MUSCULAR DYSTROPHY: Primary | ICD-10-CM

## 2022-07-29 DIAGNOSIS — Z82.49 FAMILY HISTORY OF ISCHEMIC HEART DISEASE: ICD-10-CM

## 2022-07-29 PROCEDURE — 99214 OFFICE O/P EST MOD 30 MIN: CPT | Performed by: PHYSICIAN ASSISTANT

## 2022-07-29 RX ORDER — LISINOPRIL 10 MG/1
10 TABLET ORAL DAILY
Qty: 90 TABLET | Refills: 1 | Status: SHIPPED | OUTPATIENT
Start: 2022-07-29 | End: 2023-01-26

## 2022-07-29 NOTE — PROGRESS NOTES
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 Well have her follow-up with cardioogy to discuss.   - Adult Cardiology Eval Tess Referral; Future    Essential hypertension  Stable doing well.  Follow-up in 6 months   - lisinopril (ZESTRIL) 10 MG tablet; Take 1 tablet (10 mg) by mouth daily    Family history of ischemic heart disease    - lisinopril (ZESTRIL) 10 MG tablet; Take 1 tablet (10 mg) by mouth daily                 Return in about 6 months (around 2023) for med check, BP Recheck.    Ramona Ann Aaseby-Aguilera, PA-C  [facility]    Parveen Nair is a 36 year old, presenting for the following health issues:  Hypertension      History of Present Illness       Hypertension: She presents for follow up of hypertension.  She does check blood pressure  regularly outside of the clinic. Outpatient blood pressures have not been over 140/90. She follows a low salt diet.               Review of Systems   Constitutional, HEENT, cardiovascular, pulmonary, gi and gu systems are negative, except as otherwise noted.      Objective    /70   Pulse 94   Temp 97.8  F (36.6  C) (Oral)   Resp 14   Wt 105.7 kg (233 lb)   LMP 2022 (Approximate)   SpO2 98%   BMI 37.89 kg/m    Body mass index is 37.89 kg/m .   JLW      Physical Exam   GENERAL: healthy, alert and no distress  HENT: ear canals and TM's normal, nose and mouth without ulcers or lesions  NECK: no adenopathy, no asymmetry, masses, or scars and thyroid normal to palpation  RESP: lungs clear to auscultation - no rales, rhonchi or wheezes  CV: regular rate and rhythm, normal S1 S2, no S3 or S4, no murmur, click or rub, no peripheral edema and peripheral pulses strong  MS: no gross musculoskeletal defects noted, no edema                    .  ..

## 2022-08-02 DIAGNOSIS — Z82.49 FAMILY HISTORY OF ISCHEMIC HEART DISEASE: Primary | ICD-10-CM

## 2022-08-02 DIAGNOSIS — Z82.0 FAMILY HISTORY OF MUSCULAR DYSTROPHY: ICD-10-CM

## 2022-08-02 PROBLEM — F41.9 ANXIETY: Status: ACTIVE | Noted: 2022-08-02

## 2022-08-02 PROBLEM — I10 BENIGN ESSENTIAL HYPERTENSION: Status: ACTIVE | Noted: 2022-08-02

## 2022-08-04 ENCOUNTER — OFFICE VISIT (OUTPATIENT)
Dept: CARDIOLOGY | Facility: CLINIC | Age: 37
End: 2022-08-04
Attending: PHYSICIAN ASSISTANT
Payer: COMMERCIAL

## 2022-08-04 VITALS
BODY MASS INDEX: 37.61 KG/M2 | DIASTOLIC BLOOD PRESSURE: 68 MMHG | HEIGHT: 66 IN | WEIGHT: 234 LBS | SYSTOLIC BLOOD PRESSURE: 120 MMHG | OXYGEN SATURATION: 96 % | HEART RATE: 84 BPM

## 2022-08-04 DIAGNOSIS — E66.812 CLASS 2 SEVERE OBESITY WITH SERIOUS COMORBIDITY AND BODY MASS INDEX (BMI) OF 38.0 TO 38.9 IN ADULT, UNSPECIFIED OBESITY TYPE (H): ICD-10-CM

## 2022-08-04 DIAGNOSIS — E66.01 CLASS 2 SEVERE OBESITY WITH SERIOUS COMORBIDITY AND BODY MASS INDEX (BMI) OF 38.0 TO 38.9 IN ADULT, UNSPECIFIED OBESITY TYPE (H): ICD-10-CM

## 2022-08-04 DIAGNOSIS — Z82.0 FAMILY HISTORY OF MUSCULAR DYSTROPHY: ICD-10-CM

## 2022-08-04 DIAGNOSIS — Z82.49 FAMILY HISTORY OF ISCHEMIC HEART DISEASE: ICD-10-CM

## 2022-08-04 DIAGNOSIS — R40.0 DAYTIME SOMNOLENCE: ICD-10-CM

## 2022-08-04 DIAGNOSIS — I10 BENIGN ESSENTIAL HYPERTENSION: Primary | ICD-10-CM

## 2022-08-04 DIAGNOSIS — E78.5 DYSLIPIDEMIA: ICD-10-CM

## 2022-08-04 DIAGNOSIS — R06.83 SNORING: ICD-10-CM

## 2022-08-04 DIAGNOSIS — Z14.8 CARRIER OF DUCHENNE MUSCULAR DYSTROPHY: ICD-10-CM

## 2022-08-04 PROCEDURE — 93000 ELECTROCARDIOGRAM COMPLETE: CPT | Performed by: INTERNAL MEDICINE

## 2022-08-04 PROCEDURE — 99205 OFFICE O/P NEW HI 60 MIN: CPT | Performed by: INTERNAL MEDICINE

## 2022-08-04 NOTE — LETTER
"8/4/2022    Ramona Ann Aaseby-Aguilera, PA-C  [address] [facility] [address]    RE: Nevaeh Marshall       Dear Colleague,     I had the pleasure of seeing Nevaeh Marshall in the [facility].    Clinic visit note dictated. Dictation reference number - [id number]        Today's clinic visit entailed:  Review of the result(s) of each unique test - ECG, labs  Ordering of each unique test  Prescription drug management  60 minutes spent on the date of the encounter doing chart review, history and exam, documentation and further activities per the note        Encounter Diagnoses   Name Primary?     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      Family history of ischemic heart disease      Family history of muscular dystrophy      Class 2 severe obesity with serious comorbidity and body mass index (BMI) of 38.0 to 38.9 in adult, unspecified obesity type (H)      Snoring      Daytime somnolence      Dyslipidemia          Orders Placed This Encounter   Procedures     Comprehensive metabolic panel     Lipid Profile     Sleep Study (referral)     Follow-Up with Cardiology     Comprehensive Weight Management     Echocardiogram Complete         Vitals: /68 (BP Location: Right arm, Patient Position: Sitting, Cuff Size: Adult Large)   Pulse 84   Ht 1.67 m (5' 5.75\")   Wt 106.1 kg (234 lb)   LMP 06/07/2022 (Approximate)   SpO2 96%   BMI 38.06 kg/m    Wt Readings from Last 5 Encounters:   08/04/22 106.1 kg (234 lb)   07/29/22 105.7 kg (233 lb)   07/01/22 106.1 kg (234 lb)   04/27/22 108.4 kg (239 lb)   04/08/22 108.9 kg (240 lb)               CURRENT MEDICATIONS:  Current Outpatient Medications   Medication Sig Dispense Refill     lisinopril (ZESTRIL) 10 MG tablet Take 1 tablet (10 mg) by mouth daily 90 tablet 1         ALLERGIES:  No Known Allergies    PAST MEDICAL HISTORY:    Past Medical History:   Diagnosis Date     Anxiety 08/02/2022     Benign " Informed the Patient of lab results and Provider instructions Patient gave verbal understanding   essential hypertension 2022     Family history of ischemic heart disease 2022     Family history of muscular dystrophy 2022       PAST SURGICAL HISTORY:    Past Surgical History:   Procedure Laterality Date     NO HISTORY OF SURGERY         FAMILY HISTORY:    Family History   Problem Relation Age of Onset     Anxiety Disorder Mother      Muscular Dystrophy Mother      Myocardial Infarction Father 50     Muscular Dystrophy Brother      Muscular Dystrophy Brother        SOCIAL HISTORY:    Social History     Socioeconomic History     Marital status:      Spouse name: None     Number of children: None     Years of education: None     Highest education level: None   Tobacco Use     Smoking status: Never Smoker     Smokeless tobacco: Never Used   Vaping Use     Vaping Use: Never used   Substance and Sexual Activity     Alcohol use: Yes     Comment: 4 drinks a year     Drug use: Never     Sexual activity: Yes     Partners: Male     Birth control/protection: Condom   Other Topics Concern     Parent/sibling w/ CABG, MI or angioplasty before 65F 55M? Yes     Comment: Father  at 50     Social Determinants of Health     Financial Resource Strain: Low Risk      Difficulty of Paying Living Expenses: Not hard at all   Food Insecurity: No Food Insecurity     Worried About Running Out of Food in the Last Year: Never true     Ran Out of Food in the Last Year: Never true   Transportation Needs: No Transportation Needs     Lack of Transportation (Medical): No     Lack of Transportation (Non-Medical): No   Physical Activity: Insufficiently Active     Days of Exercise per Week: 3 days     Minutes of Exercise per Session: 10 min   Stress: No Stress Concern Present     Feeling of Stress : Not at all   Social Connections: Moderately Isolated     Frequency of Communication with Friends and Family: More than three times a week     Frequency of Social Gatherings with Friends and Family: Once a week     Attends  Denominational Services: Never     Active Member of Clubs or Organizations: No     Marital Status:    Housing Stability: Low Risk      Unable to Pay for Housing in the Last Year: No     Number of Places Lived in the Last Year: 1     Unstable Housing in the Last Year: No         CC  REFERRING PROVIDER:  Ramona Ann Aaseby-Aguilera, PA-C  [address] [place] [address]    Thank you for allowing me to participate in the care of your patient.      Sincerely,     Trish Galvez MD     [facility]

## 2022-08-04 NOTE — PROGRESS NOTES
Service Date: 08/04/2022    PRIMARY CARE AND REFERRING PROVIDER:  Ramona Aaseby-Aguilera, PA-C    REASON FOR VISIT:    1.  Cardiac screening due to Duchenne muscular dystrophy carrier status.  2.  Newly diagnosed benign essential hypertension.  3.  Cardiovascular risk stratification for family history of premature coronary artery disease.    HISTORY OF PRESENT ILLNESS:  It was my pleasure to visit with Joanie, who is new to Cardiology.  She is a very pleasant 36-year-old  lady, mother of a 3-year-old son, lives with her , employed as a  (sedentary job), BMI 38 kg/m2, never tobacco user.    I addressed the following issues with her today:    1.  Cardiac screening in light of Duchenne muscular dystrophy carrier status.  Joanie has a family history of Duchenne muscular dystrophy in her mother, 2 brothers and other family members.  She herself carries the gene but does not have the phenotype.  Due to a 5% risk of cardiovascular disease in females, she wants to undergo an echocardiogram.  No symptoms of heart failure.    2.  New diagnosis of hypertension.  Over the last few months, her blood pressure has been elevated in the 140s/90s.  She was appropriately started on lisinopril 10 mg daily, tolerating this well, no side effects.  Creatinine 0.7 with a potassium of 3.9 and sodium 137.  Blood pressure is well treated at 120/68 mmHg.  She says that with lisinopril, her headache has resolved and fatigue has improved.  No family history of hypertension, probably has undiagnosed sleep apnea, 1-2 servings of caffeine, social alcohol intake, no tobacco or recreational drug use, no regular exercise, 50-pound weight gain due to excess calorie intake over the last 3 years.    3.  Cardiovascular risk stratification for family history of premature coronary artery disease.  She has no exertional chest pain, dyspnea or other cardiac symptoms.  However, her father (remote tobacco user, not obese or diabetic)  "had a fatal myocardial infarction at age 50 years and was told it was due to \" maker\".    4.  Snoring and daytime somnolence.  Has not been screened for sleep apnea.    5.  Dyslipidemia.  Total cholesterol 200, HDL 51, , triglycerides 111.  Normal TSH.  Not diabetic.  Lifestyle as above.    DIAGNOSTIC DATA:  Labs -- Reviewed.  As documented above.    ECG today shows normal sinus rhythm of 94 BPM with normal cardiac intervals.    No previous cardiac imaging.    PHYSICAL EXAMINATION:    VITAL SIGNS:  /68, pulse 84.  BMI 38, weight 234 pounds, height 5 feet 5 inches.  GENERAL:  Comfortable at rest.  Normal mood and affect, elevated BMI.  CARDIOVASCULAR:  Normal JVP, regular heart sounds, no murmur.  RESPIRATORY:  Normal breath sounds.  No rales or wheeze.  EXTREMITIES:  No edema.    DIAGNOSES:    1.  Cardiovascular screening for Duchenne muscular dystrophy carrier status.  2.  Cardiac risk counseling.  3.  New diagnosis of hypertension.  4.  Mixed dyslipidemia.  5.  Family history of premature coronary artery disease.    ASSESSMENT:  I suspect her hypertension is due to weight gain of 50 pounds over the last year, probably untreated sleep apnea, and sedentary lifestyle.  She is very motivated to make changes.    PLAN:    1.  Extensive counseling about the role of lifestyle modification (diet, exercise) in the management of hypertension, dyslipidemia, cardiac risk.  2.  Referral to 24-week weight management program.  3.  Referral to Sleep Clinic.  4.  No changes to medications.  5.  Statin not indicated at this time for an LDL of 127 in a 36 year old who is hoping to have a future pregnancy.  Lifestyle modification reiterated.  6.  Screening transthoracic echocardiogram.  7.  Follow up with me in 6 months with fasting lipids and liver enzymes.    New patient.  Total time today 60 minutes.    Trish Galvez MD    cc:  Ramona Aaseby-Aguilera, PA-C  [facility]  [address] [place]  [address]    Mothilal Sanjuana Galvez MD        D: 2022   T: 2022   MT: rosario    Name:     NUVIA THORNTON  MRN:      [id number]        Account:      [id number]   :      1985           Service Date: 2022       Document: [id number]

## 2022-08-04 NOTE — PROGRESS NOTES
"  Clinic visit note dictated. Dictation reference number - [id number]        Today's clinic visit entailed:  Review of the result(s) of each unique test - ECG, labs  Ordering of each unique test  Prescription drug management  60 minutes spent on the date of the encounter doing chart review, history and exam, documentation and further activities per the note        Encounter Diagnoses   Name Primary?     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      Family history of ischemic heart disease      Family history of muscular dystrophy      Class 2 severe obesity with serious comorbidity and body mass index (BMI) of 38.0 to 38.9 in adult, unspecified obesity type (H)      Snoring      Daytime somnolence      Dyslipidemia          Orders Placed This Encounter   Procedures     Comprehensive metabolic panel     Lipid Profile     Sleep Study (referral)     Follow-Up with Cardiology     Comprehensive Weight Management     Echocardiogram Complete         Vitals: /68 (BP Location: Right arm, Patient Position: Sitting, Cuff Size: Adult Large)   Pulse 84   Ht 1.67 m (5' 5.75\")   Wt 106.1 kg (234 lb)   LMP 06/07/2022 (Approximate)   SpO2 96%   BMI 38.06 kg/m    Wt Readings from Last 5 Encounters:   08/04/22 106.1 kg (234 lb)   07/29/22 105.7 kg (233 lb)   07/01/22 106.1 kg (234 lb)   04/27/22 108.4 kg (239 lb)   04/08/22 108.9 kg (240 lb)               CURRENT MEDICATIONS:  Current Outpatient Medications   Medication Sig Dispense Refill     lisinopril (ZESTRIL) 10 MG tablet Take 1 tablet (10 mg) by mouth daily 90 tablet 1         ALLERGIES:  No Known Allergies    PAST MEDICAL HISTORY:    Past Medical History:   Diagnosis Date     Anxiety 08/02/2022     Benign essential hypertension 08/02/2022     Family history of ischemic heart disease 08/02/2022     Family history of muscular dystrophy 08/02/2022       PAST SURGICAL HISTORY:    Past Surgical History:   Procedure Laterality Date     NO " HISTORY OF SURGERY         FAMILY HISTORY:    Family History   Problem Relation Age of Onset     Anxiety Disorder Mother      Muscular Dystrophy Mother      Myocardial Infarction Father 50     Muscular Dystrophy Brother      Muscular Dystrophy Brother        SOCIAL HISTORY:    Social History     Socioeconomic History     Marital status:      Spouse name: None     Number of children: None     Years of education: None     Highest education level: None   Tobacco Use     Smoking status: Never Smoker     Smokeless tobacco: Never Used   Vaping Use     Vaping Use: Never used   Substance and Sexual Activity     Alcohol use: Yes     Comment: 4 drinks a year     Drug use: Never     Sexual activity: Yes     Partners: Male     Birth control/protection: Condom   Other Topics Concern     Parent/sibling w/ CABG, MI or angioplasty before 65F 55M? Yes     Comment: Father  at 50     Social Determinants of Health     Financial Resource Strain: Low Risk      Difficulty of Paying Living Expenses: Not hard at all   Food Insecurity: No Food Insecurity     Worried About Running Out of Food in the Last Year: Never true     Ran Out of Food in the Last Year: Never true   Transportation Needs: No Transportation Needs     Lack of Transportation (Medical): No     Lack of Transportation (Non-Medical): No   Physical Activity: Insufficiently Active     Days of Exercise per Week: 3 days     Minutes of Exercise per Session: 10 min   Stress: No Stress Concern Present     Feeling of Stress : Not at all   Social Connections: Moderately Isolated     Frequency of Communication with Friends and Family: More than three times a week     Frequency of Social Gatherings with Friends and Family: Once a week     Attends [employer] Services: Never     Active Member of Clubs or Organizations: No     Marital Status:    Housing Stability: Low Risk      Unable to Pay for Housing in the Last Year: No     Number of Places Lived in the Last Year: 1      Unstable Housing in the Last Year: No         CC  REFERRING PROVIDER:  Ramona Ann Aaseby-Aguilera, PA-C  [id number] [place] [address]

## 2022-08-04 NOTE — PATIENT INSTRUCTIONS
1.  No medication changes today.    TESTING AND REFERRALS:  1.  Heart ultrasound (transthoracic echocardiogram).  2.  Sleep clinic referral.  3.  Weight management program referral.  4.  Follow-up with Dr. Galvez in 6 months.    If you have any questions or concerns, please contact my nurses at [phone].

## 2022-08-05 ENCOUNTER — TELEPHONE (OUTPATIENT)
Dept: CARDIOLOGY | Facility: CLINIC | Age: 37
End: 2022-08-05

## 2022-08-05 ENCOUNTER — LAB (OUTPATIENT)
Dept: LAB | Facility: CLINIC | Age: 37
End: 2022-08-05
Payer: COMMERCIAL

## 2022-08-05 ENCOUNTER — HOSPITAL ENCOUNTER (OUTPATIENT)
Dept: CARDIOLOGY | Facility: CLINIC | Age: 37
Discharge: HOME | End: 2022-08-05
Attending: INTERNAL MEDICINE | Admitting: INTERNAL MEDICINE
Payer: COMMERCIAL

## 2022-08-05 DIAGNOSIS — E78.5 DYSLIPIDEMIA: ICD-10-CM

## 2022-08-05 DIAGNOSIS — Z14.8 CARRIER OF DUCHENNE MUSCULAR DYSTROPHY: ICD-10-CM

## 2022-08-05 DIAGNOSIS — Z82.49 FAMILY HISTORY OF ISCHEMIC HEART DISEASE: ICD-10-CM

## 2022-08-05 DIAGNOSIS — I10 BENIGN ESSENTIAL HYPERTENSION: ICD-10-CM

## 2022-08-05 DIAGNOSIS — Z82.0 FAMILY HISTORY OF MUSCULAR DYSTROPHY: ICD-10-CM

## 2022-08-05 LAB
CHOLEST SERPL-MCNC: 193 MG/DL
HDLC SERPL-MCNC: 45 MG/DL
LDLC SERPL CALC-MCNC: 120 MG/DL
NONHDLC SERPL-MCNC: 148 MG/DL
TRIGL SERPL-MCNC: 141 MG/DL

## 2022-08-05 PROCEDURE — 93306 TTE W/DOPPLER COMPLETE: CPT | Mod: 26 | Performed by: INTERNAL MEDICINE

## 2022-08-05 PROCEDURE — 80061 LIPID PANEL: CPT | Performed by: INTERNAL MEDICINE

## 2022-08-05 PROCEDURE — 93306 TTE W/DOPPLER COMPLETE: CPT

## 2022-08-05 PROCEDURE — 36415 COLL VENOUS BLD VENIPUNCTURE: CPT | Performed by: INTERNAL MEDICINE

## 2022-08-05 NOTE — TELEPHONE ENCOUNTER
FLP/echo routed to Dr Galvez to review. Follow up scheduled for January. Testing ordered for family history of muscular dystrophy, pt is a genetic carrier.     Component      Latest Ref Rng & Units 8/5/2022   Cholesterol      <200 mg/dL 193   Triglycerides      <150 mg/dL 141   HDL Cholesterol      >=50 mg/dL 45 (L)   LDL Cholesterol Calculated      <=100 mg/dL 120 (H)   Non HDL Cholesterol      <130 mg/dL 148 (H)       Echo 8/5/22-  The study was technically difficult. Normal transthoracic echocardiogram      OV note 8/4/22-   1.  Extensive counseling about the role of lifestyle modification (diet, exercise) in the management of hypertension, dyslipidemia, cardiac risk.  2.  Referral to 24-week weight management program.  3.  Referral to Sleep Clinic.  4.  No changes to medications.  5.  Statin not indicated at this time for an LDL of 127 in a 36 year old who is hoping to have a future pregnancy.  Lifestyle modification reiterated.  6.  Screening transthoracic echocardiogram.  7.  Follow up with me in 6 months with fasting lipids and liver enzymes.

## 2022-08-05 NOTE — TELEPHONE ENCOUNTER
Reply from Dr. Galvez: continue same plan.    Attempted to contact patient to review results of echo as normal. Left a message with patient as an update.    Results letter mailed to patient.

## 2022-08-05 NOTE — LETTER
2022       TO: Nuvia Thornton   [address] [facility] [address]       Dear Ms. Thornton,    The results of your recent Laboratory tests and echo.  Per Dr. Roman - continue with the same plan.    Results for orders placed or performed in visit on 22   Lipid Profile     Status: Abnormal   Result Value Ref Range    Cholesterol 193 <200 mg/dL    Triglycerides 141 <150 mg/dL    Direct Measure HDL 45 (L) >=50 mg/dL    LDL Cholesterol Calculated 120 (H) <=100 mg/dL    Non HDL Cholesterol 148 (H) <130 mg/dL    Narrative    Cholesterol  Desirable:  <200 mg/dL    Triglycerides  Normal:  Less than 150 mg/dL  Borderline High:  150-199 mg/dL  High:  200-499 mg/dL  Very High:  Greater than or equal to 500 mg/dL    Direct Measure HDL  Female:  Greater than or equal to 50 mg/dL   Male:  Greater than or equal to 40 mg/dL    LDL Cholesterol  Desirable:  <100mg/dL  Above Desirable:  100-129 mg/dL   Borderline High:  130-159 mg/dL   High:  160-189 mg/dL   Very High:  >= 190 mg/dL    Non HDL Cholesterol  Desirable:  130 mg/dL  Above Desirable:  130-159 mg/dL  Borderline High:  160-189 mg/dL  High:  190-219 mg/dL  Very High:  Greater than or equal to 220 mg/dL   Results for orders placed or performed during the hospital encounter of 22   Echocardiogram Complete     Status: None    Narrative    [id number]  [id number]  [id number]  [id number]^JESSIE^MICH^SUAD     [facility]  Echocardiography Laboratory  [address] [place] [address]     Name: NUVIA THORNTON  MRN: [id number]  : 1985  Study Date: 2022 08:39 AM  Age: 36 yrs  Gender: Female  Patient Location: [facility]  Reason For Study: Benign essential hypertension, Carrier of muscular dystrophy  Ordering Physician: MICH ROMAN  Referring Physician: MICH ROMAN  Performed By: Luz Marina Powell     BSA: 2.1 m2  Height: 65 in  Weight: 234 lb  HR: 76  BP: 137/87  mmHg  ______________________________________________________________________________  Procedure  Complete Echo Adult.  ______________________________________________________________________________  Interpretation Summary     The study was technically difficult. Normal transthoracic echocardiogram.  ______________________________________________________________________________  Left Ventricle  The left ventricle is normal in structure, function and size. Left ventricular  diastolic function is normal.     Right Ventricle  The right ventricle is normal in structure, function and size.     Atria  Normal left atrial size. Right atrial size is normal.     Mitral Valve  The mitral valve is normal in structure and function.     Tricuspid Valve  Normal tricuspid valve.     Aortic Valve  The aortic valve is normal in structure and function.     Pulmonic Valve  Normal pulmonic valve.     Vessels  The aortic root is normal size. The inferior vena cava is normal.     Pericardium  There is no pericardial effusion.     Rhythm  Sinus rhythm was noted.  ______________________________________________________________________________  MMode/2D Measurements & Calculations  IVSd: 1.1 cm     LVIDd: 3.8 cm  LVIDs: 2.8 cm  LVPWd: 0.94 cm  FS: 27.2 %  LV mass(C)d: 123.6 grams  LV mass(C)dI: 58.5 grams/m2  Ao root diam: 3.1 cm  asc Aorta Diam: 2.8 cm  LVOT diam: 2.0 cm  LVOT area: 3.1 cm2  LA Volume (BP): 49.0 ml  LA Volume Index (BP): 23.2 ml/m2  RWT: 0.49     Doppler Measurements & Calculations  MV E max bruno: 69.7 cm/sec  MV A max bruno: 46.8 cm/sec  MV E/A: 1.5  MV dec time: 0.20 sec  PA acc time: 0.13 sec  E/E' av.7  Lateral E/e': 6.7  Medial E/e': 6.6     ______________________________________________________________________________  Report approved by: Kingston Lawson 2022 12:41 PM                 Sincerely,    [facility]

## 2022-08-16 ENCOUNTER — VIRTUAL VISIT (OUTPATIENT)
Dept: FAMILY MEDICINE | Facility: CLINIC | Age: 37
End: 2022-08-16
Payer: COMMERCIAL

## 2022-08-16 DIAGNOSIS — U07.1 INFECTION DUE TO 2019 NOVEL CORONAVIRUS: Primary | ICD-10-CM

## 2022-08-16 PROCEDURE — 99213 OFFICE O/P EST LOW 20 MIN: CPT | Mod: CS | Performed by: PHYSICIAN ASSISTANT

## 2022-08-16 NOTE — PROGRESS NOTES
"Joanie is a 36 year old who is being evaluated via a billable Telephone visit.      How would you like to obtain your AVS? MyChart  If the video visit is dropped, the invitation should be resent by: Text to cell phone: [phone]  Will anyone else be joining your video visit? No    Assessment & Plan     Infection due to 2019 novel coronavirus  Patient is a 36 YOF who presents to clinic due to symptoms of COVID-19 starting 2 day(s) ago with subsequent positive test results.  Patient is able to speak in full sentences-no signs of respiratory distress. Per CDC criteria, patient qualifies for prescribed treatment of COVID19 as patient meets high risk criteria. Discussed treatment options for COVID-19 as well as risks and benefits.  Patient elects to proceed with Paxlovid.  Discussed home medications and possible interactions.  Also discussed OTC options for managing symptoms of COVID-19.  Return and urgent/emergent follow-up precautions provided.    - nirmatrelvir and ritonavir (PAXLOVID) therapy pack; Take 3 tablets by mouth 2 times daily for 5 days (Take 2 Nirmatrelvir tablets and 1 Ritonavir tablet twice daily for 5 days)       COVID-19 positive patient.  Encounter for consideration of medication intervention. Patient does qualify for a prescription. Full discussion with patient including medication options, risks and benefits. Potential drug interactions reviewed with patient.     Treatment Planned Rx sent to patient's preferred [facility]: [facility]    Temporary change to home medications:  None     Estimated body mass index is 38.06 kg/m  as calculated from the following:    Height as of 8/4/22: 1.67 m (5' 5.75\").    Weight as of 8/4/22: 106.1 kg (234 lb).  GFR Estimate   Date Value Ref Range Status   04/08/2022 >90 >60 mL/min/1.73m2 Final     Comment:     Effective December 21, 2021 eGFRcr in adults is calculated using the 2021 CKD-EPI creatinine equation which includes age and gender ( et al., " NEJM, DOI: 10.1056/FXOYsw0246939)   05/12/2009 >90 >60 mL/min/1.7m2 Final     No results found for: [id number]    Return in about 1 week (around 8/23/2022), or if symptoms worsen or fail to improve.    Adela Barba PA-C  [facility] Nair is a 36 year old, presenting for the following health issues:  Covid Concern      HPI       COVID-19 Symptom Review  How many days ago did these symptoms start? 2 days. Positive COVID19 test results today.     Are any of the following symptoms significant for you?    New or worsening difficulty breathing? No    Worsening cough? No    Fever or chills? Yes, I felt feverish or had chills. 99.8.     Headache: YES    Sore throat: YES    Chest pain: No    Diarrhea: No    Body aches? No    What treatments has patient tried? Decongestant - oral and Cough syrup   Does patient live in a nursing home, group home, or shelter? No  Does patient have a way to get food/medications during quarantined? Yes, I have a friend or family member who can help me.  BMI: 37.9        Objective           Vitals:  No vitals were obtained today due to virtual visit.    Physical Exam   GENERAL: Healthy, alert and no distress  EYES: Eyes grossly normal to inspection.  No discharge or erythema, or obvious scleral/conjunctival abnormalities.  RESP: No audible wheeze, cough, or visible cyanosis.  No visible retractions or increased work of breathing.    SKIN: Visible skin clear. No significant rash, abnormal pigmentation or lesions.  NEURO: Cranial nerves grossly intact.  Mentation and speech appropriate for age.  PSYCH: Mentation appears normal, affect normal/bright, judgement and insight intact, normal speech and appearance well-groomed.                Video-Visit Details    Video Start Time: 5:31 PM    Type of service:  Video Visit    Video End Time:5:46 PM    Originating Location (pt. Location): Home    Distant Location (provider location):  [facility]      Platform used for Video Visit: Unable to complete video visit    .  ..

## 2022-08-16 NOTE — PATIENT INSTRUCTIONS
Cale Nari,      Based on your health history you do qualify for treatment of COVID-19.  For treatment you have been prescribed Paxlovid.  Paxlovid is a combined antiviral medication that reduces risk of hospitalization or severe COVID by 90%.  It is important that you  this medication and start it right away today.  The medication was sent to [facility].  If your pharmacy does not have this medication, please call back to clinic or send a [employer] message to let me know to change this to a different pharmacy.    While taking Paxlovid, you can continue Lisinopril as prescribed.     You may continue to use Tylenolfor fevers, headache, body ache.  You can also use over-the-counter decongestants and cough suppressants to help manage symptoms.    If you develop any severe symptoms of medication reaction or COVID-19 including chest pain, coughing up blood, shortness of breath, swelling, rashes, or any other severe symptoms, please call 911/go to the emergency department.    Please reach out with any questions or concerns.  Take Care,  Adela Barba PA-C    Instructions for Patients      What are the symptoms of COVID-19?  Symptoms can include fever, cough, shortness of breath, chills, headache, muscle pain sore throat, fatigue, runny or stuffy nose, and loss of taste and smell. Other less common symptoms include nausea, vomiting, or diarrhea (watery stools).    Know when to call 911. Emergency warning signs include:  Trouble breathing or shortness of breath  Pain or pressure in the chest that doesn't go away  Feeling confused like you haven't felt before, or not being able to wake up  Bluish-colored lips or face    How can I take care of myself?  Get lots of rest. Drink extra fluids (unless a doctor has told you not to).  Take Tylenol (acetaminophen) for fever or pain. If you have liver or kidney problems, ask your family doctor if it's okay to take Tylenol   Adults:   650 mg (two 325 mg pills or tablets)  every 4 to 6 hours, or...   1,000 mg (two 500 mg pills or tablets) every 8 hours as needed.  Note: Don't take more than 3,000 mg in one day. Acetaminophen is found in many medicines (both prescribed and over the counter). Read all labels to be sure you don't take too much.  For children, check the Tylenol bottle for the right dose. The dose is based on the child's age or weight.  Take over the counter medicines for your symptoms as needed. Talk to your pharmacist.  If you have other health problems (like cancer, heart failure, an organ transplant, or severe kidney disease): Call your specialty clinic if you don't feel better in the next 2 days.    These guidelines are for isolating and quarantining before returning to work, school or .   For employers, schools and day cares: This is an official notice for this person s medical guidelines for returning in-person.   For health care sites: The CDC gives different isolation and quarantine guidelines for healthcare sites, please check with these sites before arriving.     How do I self-isolate?  You isolate when you have symptoms of COVID or a test shows you have COVID, even if you don t have symptoms.   If you DO have symptoms:  Stay home and away from others  For at least 5 days after your symptoms started, AND   You are fever free for 24 hours (with no medicine that reduces fever), AND  Your other symptoms are better.  Wear a mask for 10 full days any time you are around others.  If you DON T have symptoms:  Stay at home and away from others for at least 5 days after your positive test.  Wear a mask for 10 full days any time you are around others.    How and when do I quarantine?  You quarantine when you may have been exposed to the virus and DON T have symptoms.   Stay home and away from others.   You must quarantine for 5 days after your last contact with a person who has COVID.  Note: If you are fully vaccinated, you don t need to quarantine. You should  still follow the steps below.   Wear a mask for 10 full days any time you re around others.  Get tested at least 5 days after you were exposed, even if you don t have symptoms.   If you start to have symptoms, isolate right away and get tested.    Where can I get more information?  [facility] COVID-19 Resource Hub: www.[employer].org/covid19/   CDC Quarantine & Isolation: https://www.cdc.gov/coronavirus/2019-ncov/your-health/quarantine-isolation.html   CDC - What to Do If You're Sick: https://www.cdc.gov/coronavirus/2019-ncov/if-you-are-sick/index.html  [facility] clinical trials (COVID-19 research studies): clinicalaffairs.[facility]/umn-clinical-trials  [place] Department of Health COVID-19 Public Hotline: 1-550.996.5452

## 2022-12-11 ENCOUNTER — OFFICE VISIT (OUTPATIENT)
Dept: FAMILY MEDICINE | Facility: CLINIC | Age: 37
End: 2022-12-11
Payer: COMMERCIAL

## 2022-12-11 VITALS
DIASTOLIC BLOOD PRESSURE: 85 MMHG | TEMPERATURE: 98 F | HEART RATE: 103 BPM | SYSTOLIC BLOOD PRESSURE: 121 MMHG | RESPIRATION RATE: 16 BRPM | BODY MASS INDEX: 36.59 KG/M2 | OXYGEN SATURATION: 98 % | WEIGHT: 225 LBS

## 2022-12-11 DIAGNOSIS — J06.9 VIRAL URI WITH COUGH: Primary | ICD-10-CM

## 2022-12-11 DIAGNOSIS — R07.0 THROAT PAIN: ICD-10-CM

## 2022-12-11 LAB
DEPRECATED S PYO AG THROAT QL EIA: NEGATIVE
GROUP A STREP BY PCR: NOT DETECTED

## 2022-12-11 PROCEDURE — 87651 STREP A DNA AMP PROBE: CPT | Performed by: FAMILY MEDICINE

## 2022-12-11 PROCEDURE — 99213 OFFICE O/P EST LOW 20 MIN: CPT | Performed by: FAMILY MEDICINE

## 2022-12-11 RX ORDER — BENZONATATE 100 MG/1
100 CAPSULE ORAL 3 TIMES DAILY PRN
Qty: 21 CAPSULE | Refills: 1 | Status: SHIPPED | OUTPATIENT
Start: 2022-12-11 | End: 2023-01-26

## 2022-12-11 NOTE — PROGRESS NOTES
Assessment & Plan     Throat pain  - Streptococcus A Rapid Screen w/Reflex to PCR - Clinic Collect  - Group A Streptococcus PCR Throat Swab    Viral URI with cough  - benzonatate (TESSALON) 100 MG capsule  Dispense: 21 capsule; Refill: 1     Suspect subsequent viral infections -- exam does not suggest an abscess. Strep test negative.   Discussed its possible that her throat discomfort may be from her ongoing mild cough along with rhinorrhea.  Tessalon was prescribed today to help with her coughalong with recommendation she start an oral antihistamine.  Follow-up as needed if symptoms worsen. Suspicion for pneumonia is low    Chip Hudson MD   [place] [facility]    Parveen Nair is a 37 year old female who presents to clinic today for the following health issues:  Chief Complaint   Patient presents with     Throat Pain     X2 days       Cough     X2 weeks       HPI    Patient presents today with ongoing mild cough symptoms most importantly she has had 2 days of throat discomfort.  She has no difficulty with opening her mouth.  For the pain she is been taking ibuprofen over-the-counter analgesics and lozenges.  She has no difficulty with breathing.  Symptoms started around Thanksgiving time when other family members were sick.  No individuals in the household have tested positive for COVID    She does have nasal congestion that is new. No facial pressure reported.       Patient Active Problem List    Diagnosis Date Noted     Benign essential hypertension 08/02/2022     Priority: Medium     Family history of ischemic heart disease 08/02/2022     Priority: Medium     Family history of muscular dystrophy 08/02/2022     Priority: Medium     Anxiety 08/02/2022     Priority: Medium       Current Outpatient Medications   Medication     benzonatate (TESSALON) 100 MG capsule     lisinopril (ZESTRIL) 10 MG tablet     No current facility-administered medications for this visit.           Objective    /85  (BP Location: Right arm, Patient Position: Sitting, Cuff Size: Adult Large)   Pulse 103   Temp 98  F (36.7  C) (Oral)   Resp 16   Wt 102.1 kg (225 lb)   LMP 12/03/2022 (Approximate)   SpO2 96%   BMI 36.59 kg/m    Physical Exam     CV: RRR no m/r/g  Pulm: clear bilaterally  Throat: 3+ tonsils no trismus, uvula midline, no exudates  GEN: NAD    Results for orders placed or performed in visit on 12/11/22   Streptococcus A Rapid Screen w/Reflex to PCR - Clinic Collect     Status: Normal    Specimen: Throat; Swab   Result Value Ref Range    Group A Strep antigen Negative Negative                     The use of Dragon/Drywave dictation services may have been used to construct the content in this note; any grammatical or spelling errors are non-intentional. Please contact the author of this note directly if you are in need of any clarification.

## 2022-12-11 NOTE — PATIENT INSTRUCTIONS
Start antihistamine for 1 week either cetirizine/loratadine ( over the counter)       Nasal steroid spray fluticasone for nasal congestion/inflammation once a day in each nostril      Ibuprofen 400-600mg every 4-6 hours as needed for sore throat        For cough (can take all of them together):   - Dextromethorphan 'DM' (over the counter - can be found in Robitussin/Delsym/generic cough meds)  - Honey: lozenges/cough drops or mix honey in warm water  - Tessalon/Benzonatate (prescription) every 8 hours as needed

## 2022-12-13 ENCOUNTER — OFFICE VISIT (OUTPATIENT)
Dept: URGENT CARE | Facility: URGENT CARE | Age: 37
End: 2022-12-13
Payer: COMMERCIAL

## 2022-12-13 ENCOUNTER — NURSE TRIAGE (OUTPATIENT)
Dept: NURSING | Facility: CLINIC | Age: 37
End: 2022-12-13

## 2022-12-13 VITALS
DIASTOLIC BLOOD PRESSURE: 83 MMHG | RESPIRATION RATE: 20 BRPM | OXYGEN SATURATION: 98 % | HEART RATE: 78 BPM | SYSTOLIC BLOOD PRESSURE: 135 MMHG | TEMPERATURE: 98 F

## 2022-12-13 DIAGNOSIS — Z82.49 FAMILY HISTORY OF ISCHEMIC HEART DISEASE: Primary | ICD-10-CM

## 2022-12-13 DIAGNOSIS — H65.93 OME (OTITIS MEDIA WITH EFFUSION), BILATERAL: Primary | ICD-10-CM

## 2022-12-13 DIAGNOSIS — Z13.220 SCREENING FOR HYPERLIPIDEMIA: ICD-10-CM

## 2022-12-13 PROCEDURE — 99213 OFFICE O/P EST LOW 20 MIN: CPT | Performed by: PHYSICIAN ASSISTANT

## 2022-12-13 RX ORDER — CEFDINIR 300 MG/1
300 CAPSULE ORAL 2 TIMES DAILY
Qty: 20 CAPSULE | Refills: 0 | Status: SHIPPED | OUTPATIENT
Start: 2022-12-13 | End: 2022-12-23

## 2022-12-13 NOTE — PROGRESS NOTES
OME (otitis media with effusion), bilateral  - cefdinir (OMNICEF) 300 MG capsule; Take 1 capsule (300 mg) by mouth 2 times daily for 10 days       Middle Ear Infection (Otitis Media) in Adults  What is a middle ear infection?  A middle ear infection occurs behind the eardrum. It is most often caused by a virus or bacteria. Most kids have at least one middle ear infection by the time they are 3 years old. But adults can also get them.   What causes middle ear infections?  Inflammation in the middle ear most often starts after you ve had a sore throat, cold, or other upper respiratory problem. The infection spreads to the middle ear and causes fluid buildup behind the eardrum.    What are the symptoms of a middle ear infection?  These are the most common symptoms of middle ear infections in adults:     Ear pain    Feeling of fullness in the ear    Fluid draining from the ear    Fever    Hearing loss  These symptoms may look like other conditions or health problems. Always talk with your healthcare provider for a diagnosis.   How is a middle ear infection diagnosed?  Your healthcare provider will review your health history and do a physical exam. They will check the outer ear and the eardrum using an otoscope. This is a lighted tool that lets the healthcare provider see inside the ear. A pneumatic otoscope blows a puff of air into the ear to test eardrum movement. When there is fluid or infection in the middle ear, movement is decreased.   Your provider may also do a tympanometry. This is a test that directs air and sound to the middle ear.   If you have ear infections often, your healthcare provider may suggest having a hearing test.   How is a middle ear infection treated?  Treatment will depend on your symptoms, age, and general health. It will also depend on how severe the condition is.   Treatment may include:    Antibiotics    Pain relievers    Placing small tubes in the eardrum for chronic ear  infections   What are possible complications of a middle ear infection?   Untreated ear infections can lead to:    Infection in other parts of the head    Lasting (permanent) hearing loss    Speech and language problems  Can middle ear infections be prevented?  Cold and allergy medicines don't seem to prevent ear infections. And currently there is no vaccine that can prevent the disease. But check with your healthcare provider and make sure your vaccines are up-to-date. Living in a home where cigarettes are smoked can increase the chances of ear infections. So can living in a home where vaping devices, such as e-cigarettes and electronic nicotine, are used   Key points about middle ear infections    Middle ear infections can affect both children and adults.    Pain and fever can be the most common symptoms.    Without treatment, permanent hearing loss may happen.    Take antibiotics as prescribed and finish all of the prescription. This can help prevent antibiotic-resistant infections or incomplete treatment with the infection returning.    Keeping your home smoke-free or free of vaping devices can decrease the chances of ear infections.     Next steps  Tips to help you get the most from a visit to your healthcare provider:    Know the reason for your visit and what you want to happen.    Before your visit, write down questions you want answered.    Bring someone with you to help you ask questions and remember what your provider tells you.    At the visit, write down the name of a new diagnosis, and any new medicines, treatments, or tests. Also write down any new instructions your provider gives you.    Know why a new medicine or treatment is prescribed, and how it will help you. Also know what the side effects are.    Ask if your condition can be treated in other ways.    Know why a test or procedure is recommended and what the results could mean.    Know what to expect if you do not take the medicine or have the  test or procedure.    If you have a follow-up appointment, write down the date, time, and purpose for that visit.    Know how you can contact your provider if you have questions.  [employer] last reviewed this educational content on 1/1/2021 2000-2021 [employer]. All rights reserved. This information is not intended as a substitute for professional medical care. Always follow your healthcare professional's instructions.                 Niles Aguilar PA-C  [facility]    Subjective   37 year old who presents to clinic today for the following health issues:    Ear Problem       HPI     Acute Illness  Acute illness concerns: Poss ear infection and sinus X2 days   Onset/Duration:   Symptoms: Cold symptoms for 2 weeks and 2 days of ear discomfort   Fever: No  Chills/Sweats: No  Headache (location?): No  Sinus Pressure: No  Conjunctivitis:  No  Ear Pain: YES: bilateral  Rhinorrhea: YES  Congestion: YES  Sore Throat: No  Cough: YES  Wheeze: No  Decreased Appetite: No  Nausea: No  Vomiting: No  Diarrhea: No  Dysuria/Freq.: No  Dysuria or Hematuria: No  Fatigue/Achiness: YES  Sick/Strep Exposure: No  Therapies tried and outcome: Tessalon perles     Review of Systems   Review of Systems   See HPI    Objective    Temp: 98  F (36.7  C)   BP: 135/83 Pulse: 78   Resp: 20 SpO2: 98 %       Physical Exam   Physical Exam  Constitutional:       General: She is not in acute distress.     Appearance: Normal appearance. She is normal weight. She is not ill-appearing, toxic-appearing or diaphoretic.   HENT:      Head: Normocephalic and atraumatic.      Right Ear: Ear canal and external ear normal. There is no impacted cerumen. Tympanic membrane is erythematous and bulging.      Left Ear: Ear canal and external ear normal. There is no impacted cerumen. Tympanic membrane is erythematous and bulging.   Cardiovascular:      Rate and Rhythm: Normal rate and regular rhythm.      Pulses: Normal pulses.       Heart sounds: Normal heart sounds. No murmur heard.    No friction rub. No gallop.   Pulmonary:      Effort: Pulmonary effort is normal. No respiratory distress.      Breath sounds: Normal breath sounds. No stridor. No wheezing, rhonchi or rales.   Chest:      Chest wall: No tenderness.   Neurological:      General: No focal deficit present.      Mental Status: She is alert and oriented to person, place, and time. Mental status is at baseline.      Gait: Gait normal.   Psychiatric:         Mood and Affect: Mood normal.         Behavior: Behavior normal.         Thought Content: Thought content normal.         Judgment: Judgment normal.          No results found for this or any previous visit (from the past 24 hour(s)).

## 2022-12-13 NOTE — TELEPHONE ENCOUNTER
Patient has been sick for three weeks and states that she went to urgent care on Sunday and was told negative for strep.  Sunday night right ear plugged and Monday both ears plugged.  Patient is having discharge from both ears.  Discharge is clear.  Today right ear is plugged and left ear is leaking and painful.  Cough is present green mucus.  Denies fever.  Denies shortness of breath.  Patient denies bloody discharge after a head or face injury.  Denies fever.  Patient states that she will go to urgent care.      Reason for Disposition    Ear pain    Additional Information    Negative: [1] Bloody or clear ear discharge AND [2] after a head or face injury    Negative: [1] Unexplained bleeding AND [2] lasts > 10 minutes or large amount (Exception: if a few drops of blood, continue with triage)    Negative: Fever > 103 F (39.4 C)    Negative: Patient sounds very sick or weak to the triager    Negative: Diabetes mellitus or weak immune system (e.g., HIV positive, cancer chemo, splenectomy, organ transplant, chronic steroids)    Protocols used: EAR - [id number]

## 2023-01-14 ENCOUNTER — HEALTH MAINTENANCE LETTER (OUTPATIENT)
Age: 38
End: 2023-01-14

## 2023-01-25 ENCOUNTER — LAB (OUTPATIENT)
Dept: LAB | Facility: CLINIC | Age: 38
End: 2023-01-25
Payer: COMMERCIAL

## 2023-01-25 DIAGNOSIS — Z13.220 SCREENING FOR HYPERLIPIDEMIA: ICD-10-CM

## 2023-01-25 DIAGNOSIS — E78.5 DYSLIPIDEMIA: ICD-10-CM

## 2023-01-25 LAB
ALBUMIN SERPL BCG-MCNC: 4.2 G/DL (ref 3.5–5.2)
ALP SERPL-CCNC: 93 U/L (ref 35–104)
ALT SERPL W P-5'-P-CCNC: 40 U/L (ref 10–35)
ANION GAP SERPL CALCULATED.3IONS-SCNC: 12 MMOL/L (ref 7–15)
AST SERPL W P-5'-P-CCNC: 28 U/L (ref 10–35)
BILIRUB SERPL-MCNC: 0.2 MG/DL
BUN SERPL-MCNC: 9.4 MG/DL (ref 6–20)
CALCIUM SERPL-MCNC: 9.1 MG/DL (ref 8.6–10)
CHLORIDE SERPL-SCNC: 105 MMOL/L (ref 98–107)
CHOLEST SERPL-MCNC: 180 MG/DL
CREAT SERPL-MCNC: 0.8 MG/DL (ref 0.51–0.95)
DEPRECATED HCO3 PLAS-SCNC: 21 MMOL/L (ref 22–29)
GFR SERPL CREATININE-BSD FRML MDRD: >90 ML/MIN/1.73M2
GLUCOSE SERPL-MCNC: 96 MG/DL (ref 70–99)
HDLC SERPL-MCNC: 46 MG/DL
LDLC SERPL CALC-MCNC: 121 MG/DL
NONHDLC SERPL-MCNC: 134 MG/DL
POTASSIUM SERPL-SCNC: 3.8 MMOL/L (ref 3.4–5.3)
PROT SERPL-MCNC: 7.2 G/DL (ref 6.4–8.3)
SODIUM SERPL-SCNC: 138 MMOL/L (ref 136–145)
TRIGL SERPL-MCNC: 66 MG/DL

## 2023-01-25 PROCEDURE — 36415 COLL VENOUS BLD VENIPUNCTURE: CPT

## 2023-01-25 PROCEDURE — 80061 LIPID PANEL: CPT

## 2023-01-25 PROCEDURE — 80053 COMPREHEN METABOLIC PANEL: CPT

## 2023-01-26 ENCOUNTER — OFFICE VISIT (OUTPATIENT)
Dept: CARDIOLOGY | Facility: CLINIC | Age: 38
End: 2023-01-26
Attending: INTERNAL MEDICINE
Payer: COMMERCIAL

## 2023-01-26 VITALS
DIASTOLIC BLOOD PRESSURE: 72 MMHG | HEIGHT: 66 IN | SYSTOLIC BLOOD PRESSURE: 112 MMHG | HEART RATE: 91 BPM | BODY MASS INDEX: 36.96 KG/M2 | WEIGHT: 230 LBS | OXYGEN SATURATION: 99 %

## 2023-01-26 DIAGNOSIS — Z14.8 CARRIER OF DUCHENNE MUSCULAR DYSTROPHY: Primary | ICD-10-CM

## 2023-01-26 DIAGNOSIS — E78.5 DYSLIPIDEMIA: ICD-10-CM

## 2023-01-26 DIAGNOSIS — I10 ESSENTIAL HYPERTENSION: ICD-10-CM

## 2023-01-26 DIAGNOSIS — I10 BENIGN ESSENTIAL HYPERTENSION: ICD-10-CM

## 2023-01-26 PROBLEM — F41.9 ANXIETY: Status: RESOLVED | Noted: 2022-08-02 | Resolved: 2023-01-26

## 2023-01-26 PROCEDURE — 99214 OFFICE O/P EST MOD 30 MIN: CPT | Performed by: INTERNAL MEDICINE

## 2023-01-26 RX ORDER — LISINOPRIL 10 MG/1
10 TABLET ORAL DAILY
Qty: 100 TABLET | Refills: 6 | Status: SHIPPED | OUTPATIENT
Start: 2023-01-26 | End: 2023-06-20

## 2023-01-26 NOTE — PROGRESS NOTES
SERVICE DATE: 2023    PRIMARY CARE PROVIDER:  Aaseby-Aguilera, Ramona Ann  [address] [facility] [id number]    REASON FOR VISIT:  1.  Follow-up of hypertension after medication changes  2.  Follow-up of Duchenne muscular dystrophy carrier status with echocardiogram results.    HISTORY OF PRESENT ILLNESS:  It was my pleasure to follow up with Joanie, who is a very pleasant 36-year-old  lady, mother of a 3-year-old son, lives with her , employed as a  (sedentary job), BMI 38 kg/m2, never tobacco user.    The following issues were addressed today:    1.  Cardiac screening in light of Duchenne muscular dystrophy carrier status.   Joanie has a family history of Duchenne muscular dystrophy in her mother, 2 brothers and other family members.    One of her brothers  recently from Duchenne muscular dystrophy.  She herself carries the gene but does not have the phenotype.  Due to a 5% risk of cardiovascular disease in females, she underwent an echocardiogram which I reviewed.  It is a normal study.  Preserved biventricular systolic function, LVEF 60 from 65%, normal right ventricular systolic function, normal cardiac valves.  ECG shows normal sinus rhythm of 94 BPM with normal cardiac intervals.     2. New diagnosis of hypertension.  On lisinopril 10 mg daily started for blood pressure in the 140s/90s.  Tolerating this well.  Normal creatinine and renal panel.  /72 mmHg.    3.  Dyslipidemia.  Total cholesterol 200, HDL 51, , triglycerides 111.   4.  Obesity with BMI 38.  At her last visit I had referred her to sleep clinic and weight management clinic.  She did not follow-up with these because in the interval, her older brother  from complications of Duchenne muscular dystrophy at age 42 years.  Understandably, she and her family are grieving.    Independent historian:     Diagnosis and treatment impacted by social determinants of health:    DIAGNOSTIC DATA:  I reviewed  "results of tests -- labs, as documented above.    I personally reviewed and independently interpreted images of-- transthoracic echocardiogram images, ECG image, as documented above.      PHYSICAL EXAMINATION:  Vitals: /72 (BP Location: Right arm, Patient Position: Standing, Cuff Size: Adult Large)   Pulse 91   Ht 1.67 m (5' 5.75\")   Wt 104.3 kg (230 lb)   LMP 12/03/2022 (Approximate)   SpO2 99%   BMI 37.41 kg/m      DIAGNOSES/ASSESSMENT:    1.  Benign essential hypertension.  Well-controlled.  Continue lisinopril 10 mg daily.  2.  Duchenne muscular dystrophy gene carrier.  Structurally normal heart on echocardiogram.  Repeat screening in 5 years.  3.  Dyslipidemia and obesity.  Counseled about lifestyle modification.  She is hoping to have another child.  Statin not indicated at this time.      PLAN:  1.  I renewed her prescriptions for lisinopril 10 mg daily for a year.  Future refills per primary provider.    2.  Echocardiogram and follow-up with me in 5 years.    3.  Patient education and counseling:  -- Counseled about importance of lifestyle modification (daily moderate intensity exercise for at least 30 minutes, calorie restriction, Mediterranean style diet) to achieve normal BMI and correct dyslipidemia, correct hypertension and hopefully, for medications.  -- Reviewed all tests and management plan in detail with patient.    Established patient. Moderate complexity medical decision making.    Trish Galvez MD      Today's clinic visit entailed:  The level of medical decision making during this visit was of moderate complexity.        Encounter Diagnoses   Name Primary?     Carrier of Duchenne muscular dystrophy Yes     Benign essential hypertension      Dyslipidemia      Essential hypertension            CURRENT MEDICATIONS:  Current Outpatient Medications   Medication Sig Dispense Refill     lisinopril (ZESTRIL) 10 MG tablet Take 1 tablet (10 mg) by mouth daily 100 tablet 6 "         ALLERGIES:  No Known Allergies    PAST MEDICAL HISTORY:    Past Medical History:   Diagnosis Date     Anxiety 2022     Benign essential hypertension 2022     Family history of ischemic heart disease 2022     Family history of muscular dystrophy 2022       PAST SURGICAL HISTORY:    Past Surgical History:   Procedure Laterality Date     NO HISTORY OF SURGERY         FAMILY HISTORY:    Family History   Problem Relation Age of Onset     Anxiety Disorder Mother      Muscular Dystrophy Mother      Myocardial Infarction Father 50     Muscular Dystrophy Brother      Muscular Dystrophy Brother        SOCIAL HISTORY:    Social History     Socioeconomic History     Marital status:      Spouse name: None     Number of children: None     Years of education: None     Highest education level: None   Tobacco Use     Smoking status: Never     Smokeless tobacco: Never   Vaping Use     Vaping Use: Never used   Substance and Sexual Activity     Alcohol use: Yes     Comment: 4 drinks a year     Drug use: Never     Sexual activity: Yes     Partners: Male     Birth control/protection: Condom   Other Topics Concern     Parent/sibling w/ CABG, MI or angioplasty before 65F 55M? Yes     Comment: Father  at 50     Social Determinants of Health     Financial Resource Strain: Low Risk      Difficulty of Paying Living Expenses: Not hard at all   Food Insecurity: No Food Insecurity     Worried About Running Out of Food in the Last Year: Never true     Ran Out of Food in the Last Year: Never true   Transportation Needs: No Transportation Needs     Lack of Transportation (Medical): No     Lack of Transportation (Non-Medical): No   Physical Activity: Insufficiently Active     Days of Exercise per Week: 3 days     Minutes of Exercise per Session: 10 min   Stress: No Stress Concern Present     Feeling of Stress : Not at all   Social Connections: Moderately Isolated     Frequency of Communication with Friends  and Family: More than three times a week     Frequency of Social Gatherings with Friends and Family: Once a week     Attends Denominational Services: Never     Active Member of Clubs or Organizations: No     Marital Status:    Housing Stability: Low Risk      Unable to Pay for Housing in the Last Year: No     Number of Places Lived in the Last Year: 1     Unstable Housing in the Last Year: No

## 2023-01-26 NOTE — LETTER
2023    Ramona Ann Aaseby-Aguilera, PA-C  [id number] [id number]    RE: Nevaeh Marshall       Dear Colleague,     I had the pleasure of seeing Nevaeh Marshall in the [facility].    SERVICE DATE: 2023    PRIMARY CARE PROVIDER:  Aaseby-Aguilera, Ramona Ann  [address] [id number]    REASON FOR VISIT:  1.  Follow-up of hypertension after medication changes  2.  Follow-up of Duchenne muscular dystrophy carrier status with echocardiogram results.    HISTORY OF PRESENT ILLNESS:  It was my pleasure to follow up with Joanie, who is a very pleasant 36-year-old  lady, mother of a 3-year-old son, lives with her , employed as a  (sedentary job), BMI 38 kg/m2, never tobacco user.    The following issues were addressed today:    1.  Cardiac screening in light of Duchenne muscular dystrophy carrier status.   Joanie has a family history of Duchenne muscular dystrophy in her mother, 2 brothers and other family members.    One of her brothers  recently from Duchenne muscular dystrophy.  She herself carries the gene but does not have the phenotype.  Due to a 5% risk of cardiovascular disease in females, she underwent an echocardiogram which I reviewed.  It is a normal study.  Preserved biventricular systolic function, LVEF 60 from 65%, normal right ventricular systolic function, normal cardiac valves.  ECG shows normal sinus rhythm of 94 BPM with normal cardiac intervals.     2. New diagnosis of hypertension.  On lisinopril 10 mg daily started for blood pressure in the 140s/90s.  Tolerating this well.  Normal creatinine and renal panel.  /72 mmHg.    3.  Dyslipidemia.  Total cholesterol 200, HDL 51, , triglycerides 111.   4.  Obesity with BMI 38.  At her last visit I had referred her to sleep clinic and weight management clinic.  She did not follow-up with these because in the interval, her older brother  from complications  "of Duchenne muscular dystrophy at age 42 years.  Understandably, she and her family are grieving.    Independent historian:     Diagnosis and treatment impacted by social determinants of health:    DIAGNOSTIC DATA:  I reviewed results of tests -- labs, as documented above.    I personally reviewed and independently interpreted images of-- transthoracic echocardiogram images, ECG image, as documented above.      PHYSICAL EXAMINATION:  Vitals: /72 (BP Location: Right arm, Patient Position: Standing, Cuff Size: Adult Large)   Pulse 91   Ht 1.67 m (5' 5.75\")   Wt 104.3 kg (230 lb)   LMP 12/03/2022 (Approximate)   SpO2 99%   BMI 37.41 kg/m      DIAGNOSES/ASSESSMENT:    1.  Benign essential hypertension.  Well-controlled.  Continue lisinopril 10 mg daily.  2.  Duchenne muscular dystrophy gene carrier.  Structurally normal heart on echocardiogram.  Repeat screening in 5 years.  3.  Dyslipidemia and obesity.  Counseled about lifestyle modification.  She is hoping to have another child.  Statin not indicated at this time.      PLAN:  1.  I renewed her prescriptions for lisinopril 10 mg daily for a year.  Future refills per primary provider.    2.  Echocardiogram and follow-up with me in 5 years.    3.  Patient education and counseling:  -- Counseled about importance of lifestyle modification (daily moderate intensity exercise for at least 30 minutes, calorie restriction, Mediterranean style diet) to achieve normal BMI and correct dyslipidemia, correct hypertension and hopefully, for medications.  -- Reviewed all tests and management plan in detail with patient.    Established patient. Moderate complexity medical decision making.    Trish Galvez MD      Today's clinic visit entailed:  The level of medical decision making during this visit was of moderate complexity.        Encounter Diagnoses   Name Primary?     Carrier of Duchenne muscular dystrophy Yes     Benign essential hypertension      Dyslipidemia  "     Essential hypertension            CURRENT MEDICATIONS:  Current Outpatient Medications   Medication Sig Dispense Refill     lisinopril (ZESTRIL) 10 MG tablet Take 1 tablet (10 mg) by mouth daily 100 tablet 6         ALLERGIES:  No Known Allergies    PAST MEDICAL HISTORY:    Past Medical History:   Diagnosis Date     Anxiety 2022     Benign essential hypertension 2022     Family history of ischemic heart disease 2022     Family history of muscular dystrophy 2022       PAST SURGICAL HISTORY:    Past Surgical History:   Procedure Laterality Date     NO HISTORY OF SURGERY         FAMILY HISTORY:    Family History   Problem Relation Age of Onset     Anxiety Disorder Mother      Muscular Dystrophy Mother      Myocardial Infarction Father 50     Muscular Dystrophy Brother      Muscular Dystrophy Brother        SOCIAL HISTORY:    Social History     Socioeconomic History     Marital status:      Spouse name: None     Number of children: None     Years of education: None     Highest education level: None   Tobacco Use     Smoking status: Never     Smokeless tobacco: Never   Vaping Use     Vaping Use: Never used   Substance and Sexual Activity     Alcohol use: Yes     Comment: 4 drinks a year     Drug use: Never     Sexual activity: Yes     Partners: Male     Birth control/protection: Condom   Other Topics Concern     Parent/sibling w/ CABG, MI or angioplasty before 65F 55M? Yes     Comment: Father  at 50     Social Determinants of Health     Financial Resource Strain: Low Risk      Difficulty of Paying Living Expenses: Not hard at all   Food Insecurity: No Food Insecurity     Worried About Running Out of Food in the Last Year: Never true     Ran Out of Food in the Last Year: Never true   Transportation Needs: No Transportation Needs     Lack of Transportation (Medical): No     Lack of Transportation (Non-Medical): No   Physical Activity: Insufficiently Active     Days of Exercise per  Week: 3 days     Minutes of Exercise per Session: 10 min   Stress: No Stress Concern Present     Feeling of Stress : Not at all   Social Connections: Moderately Isolated     Frequency of Communication with Friends and Family: More than three times a week     Frequency of Social Gatherings with Friends and Family: Once a week     Attends Sikhism Services: Never     Active Member of Clubs or Organizations: No     Marital Status:    Housing Stability: Low Risk      Unable to Pay for Housing in the Last Year: No     Number of Places Lived in the Last Year: 1     Unstable Housing in the Last Year: No       Thank you for allowing me to participate in the care of your patient.      Sincerely,     Trish Galvez MD     [facility]  cc:   Trish Galvez MD  [address] [place] [id number]

## 2023-06-20 ENCOUNTER — OFFICE VISIT (OUTPATIENT)
Dept: FAMILY MEDICINE | Facility: CLINIC | Age: 38
End: 2023-06-20
Payer: COMMERCIAL

## 2023-06-20 VITALS
HEIGHT: 66 IN | WEIGHT: 187 LBS | OXYGEN SATURATION: 99 % | DIASTOLIC BLOOD PRESSURE: 80 MMHG | SYSTOLIC BLOOD PRESSURE: 120 MMHG | RESPIRATION RATE: 16 BRPM | TEMPERATURE: 98.1 F | BODY MASS INDEX: 30.05 KG/M2 | HEART RATE: 91 BPM

## 2023-06-20 DIAGNOSIS — E66.812 CLASS 2 SEVERE OBESITY WITH SERIOUS COMORBIDITY AND BODY MASS INDEX (BMI) OF 38.0 TO 38.9 IN ADULT, UNSPECIFIED OBESITY TYPE (H): ICD-10-CM

## 2023-06-20 DIAGNOSIS — E66.01 CLASS 2 SEVERE OBESITY WITH SERIOUS COMORBIDITY AND BODY MASS INDEX (BMI) OF 38.0 TO 38.9 IN ADULT, UNSPECIFIED OBESITY TYPE (H): ICD-10-CM

## 2023-06-20 DIAGNOSIS — Z13.29 SCREENING FOR THYROID DISORDER: ICD-10-CM

## 2023-06-20 DIAGNOSIS — Z13.1 SCREENING FOR DIABETES MELLITUS: Primary | ICD-10-CM

## 2023-06-20 LAB
ANION GAP SERPL CALCULATED.3IONS-SCNC: 13 MMOL/L (ref 7–15)
BUN SERPL-MCNC: 12.6 MG/DL (ref 6–20)
CALCIUM SERPL-MCNC: 9.9 MG/DL (ref 8.6–10)
CHLORIDE SERPL-SCNC: 105 MMOL/L (ref 98–107)
CREAT SERPL-MCNC: 0.75 MG/DL (ref 0.51–0.95)
DEPRECATED HCO3 PLAS-SCNC: 24 MMOL/L (ref 22–29)
GFR SERPL CREATININE-BSD FRML MDRD: >90 ML/MIN/1.73M2
GLUCOSE SERPL-MCNC: 100 MG/DL (ref 70–99)
POTASSIUM SERPL-SCNC: 4.6 MMOL/L (ref 3.4–5.3)
SODIUM SERPL-SCNC: 142 MMOL/L (ref 136–145)
TSH SERPL DL<=0.005 MIU/L-ACNC: 0.92 UIU/ML (ref 0.3–4.2)

## 2023-06-20 PROCEDURE — 99395 PREV VISIT EST AGE 18-39: CPT | Performed by: PHYSICIAN ASSISTANT

## 2023-06-20 PROCEDURE — 84443 ASSAY THYROID STIM HORMONE: CPT | Performed by: PHYSICIAN ASSISTANT

## 2023-06-20 PROCEDURE — 36415 COLL VENOUS BLD VENIPUNCTURE: CPT | Performed by: PHYSICIAN ASSISTANT

## 2023-06-20 PROCEDURE — 80048 BASIC METABOLIC PNL TOTAL CA: CPT | Performed by: PHYSICIAN ASSISTANT

## 2023-06-20 ASSESSMENT — ENCOUNTER SYMPTOMS
DYSURIA: 0
SORE THROAT: 0
CONSTIPATION: 0
NERVOUS/ANXIOUS: 0
COUGH: 0
WEAKNESS: 0
HEADACHES: 0
NAUSEA: 0
JOINT SWELLING: 0
CHILLS: 0
PALPITATIONS: 0
ABDOMINAL PAIN: 0
DIARRHEA: 0
FREQUENCY: 0
PARESTHESIAS: 0
BREAST MASS: 0
DIZZINESS: 0
SHORTNESS OF BREATH: 0
FEVER: 0
HEARTBURN: 0
EYE PAIN: 0
ARTHRALGIAS: 0
HEMATURIA: 0
MYALGIAS: 0
HEMATOCHEZIA: 0

## 2023-06-20 ASSESSMENT — PAIN SCALES - GENERAL: PAINLEVEL: NO PAIN (0)

## 2023-06-20 NOTE — PROGRESS NOTES
"  Assessment & Plan     (Z13.1) Screening for diabetes mellitus  (primary encounter diagnosis)  Comment:   Plan: Basic metabolic panel            (Z13.29) Screening for thyroid disorder  Comment:   Plan: TSH with free T4 reflex       (E66.01,  Z68.38) Class 2 severe obesity with serious comorbidity and body mass index (BMI) of 38.0 to 38.9 in adult, unspecified obesity type (H)  Comment: has lost 29 pounds.   Plan:             BMI:   Estimated body mass index is 30.41 kg/m  as calculated from the following:    Height as of this encounter: 1.67 m (5' 5.75\").    Weight as of this encounter: 84.8 kg (187 lb).           Ramona Ann Aaseby-Aguilera, PA-C  [facility] is a 37 year old, presenting for the following health issues:  Hypertension (Stopped taking her blood pressure medications in March.  Has lost 40 lbs. )        6/20/2023     1:38 PM   Additional Questions   Roomed by Beth Salter CMA   Accompanied by Self     Healthy Habits:     Getting at least 3 servings of Calcium per day:  Yes    Bi-annual eye exam:  Yes    Dental care twice a year:  Yes    Sleep apnea or symptoms of sleep apnea:  None    Diet:  Other    Frequency of exercise:  1 day/week    Duration of exercise:  Less than 15 minutes    Taking medications regularly:  Yes    Medication side effects:  Not applicable    PHQ-2 Total Score: 0    Additional concerns today:  No           Review of Systems   Constitutional: Negative for chills and fever.   HENT: Negative for congestion, ear pain, hearing loss and sore throat.    Eyes: Negative for pain and visual disturbance.   Respiratory: Negative for cough and shortness of breath.    Cardiovascular: Negative for chest pain, palpitations and peripheral edema.   Gastrointestinal: Negative for abdominal pain, constipation, diarrhea, heartburn, hematochezia and nausea.   Breasts:  Negative for tenderness, breast mass and discharge.   Genitourinary: Negative for " "dysuria, frequency, genital sores, hematuria, pelvic pain, urgency, vaginal bleeding and vaginal discharge.   Musculoskeletal: Negative for arthralgias, joint swelling and myalgias.   Skin: Negative for rash.   Neurological: Negative for dizziness, weakness, headaches and paresthesias.   Psychiatric/Behavioral: Negative for mood changes. The patient is not nervous/anxious.             Objective    BP (!) 120/90 (BP Location: Right arm, Patient Position: Sitting, Cuff Size: Adult Regular)   Pulse 91   Temp 98.1  F (36.7  C) (Oral)   Resp 16   Ht 1.67 m (5' 5.75\")   Wt 84.8 kg (187 lb)   LMP 06/06/2023   SpO2 99%   BMI 30.41 kg/m    Body mass index is 30.41 kg/m .  Physical Exam   GENERAL: healthy, alert and no distress  EYES: Eyes grossly normal to inspection, PERRL and conjunctivae and sclerae normal  HENT: ear canals and TM's normal, nose and mouth without ulcers or lesions  NECK: no adenopathy, no asymmetry, masses, or scars and thyroid normal to palpation  RESP: lungs clear to auscultation - no rales, rhonchi or wheezes  BREAST: normal without masses, tenderness or nipple discharge and no palpable axillary masses or adenopathy  CV: regular rate and rhythm, normal S1 S2, no S3 or S4, no murmur, click or rub, no peripheral edema and peripheral pulses strong  ABDOMEN: soft, nontender, no hepatosplenomegaly, no masses and bowel sounds normal  MS: no gross musculoskeletal defects noted, no edema  SKIN: no suspicious lesions or rashes  NEURO: Normal strength and tone, mentation intact and speech normal  PSYCH: mentation appears normal, affect normal/bright  LYMPH: no cervical, supraclavicular, axillary, or inguinal adenopathy                    "

## 2023-09-02 ENCOUNTER — OFFICE VISIT (OUTPATIENT)
Dept: URGENT CARE | Facility: URGENT CARE | Age: 38
End: 2023-09-02
Payer: COMMERCIAL

## 2023-09-02 VITALS
HEART RATE: 40 BPM | SYSTOLIC BLOOD PRESSURE: 127 MMHG | OXYGEN SATURATION: 98 % | DIASTOLIC BLOOD PRESSURE: 84 MMHG | TEMPERATURE: 98.2 F | WEIGHT: 188 LBS | BODY MASS INDEX: 30.58 KG/M2

## 2023-09-02 DIAGNOSIS — J02.0 STREP THROAT: Primary | ICD-10-CM

## 2023-09-02 DIAGNOSIS — R07.0 THROAT PAIN: ICD-10-CM

## 2023-09-02 LAB — DEPRECATED S PYO AG THROAT QL EIA: POSITIVE

## 2023-09-02 PROCEDURE — 87880 STREP A ASSAY W/OPTIC: CPT | Performed by: PHYSICIAN ASSISTANT

## 2023-09-02 PROCEDURE — 99213 OFFICE O/P EST LOW 20 MIN: CPT | Performed by: PHYSICIAN ASSISTANT

## 2023-09-02 RX ORDER — AMOXICILLIN 875 MG
875 TABLET ORAL 2 TIMES DAILY
Qty: 20 TABLET | Refills: 0 | Status: SHIPPED | OUTPATIENT
Start: 2023-09-02 | End: 2023-09-12

## 2023-09-02 NOTE — PROGRESS NOTES
SUBJECTIVE:  Nevaeh Marshall is a 37 year old female who comes in with a 5-day history of sore throat.  Patient states that originally her throat started she thought it was due to being at a bonfire.  The following day her throat persisted and slightly increased.  Today she noticed a rash on her abdomen.  She has had strep with similar symptoms in the past.  She denies any fevers.  No cough or cold symptoms.  Denies any GI symptoms or rashes.  She is otherwise at baseline health.  She is unsure of any exposures.        Past Medical History:   Diagnosis Date    Anxiety 2022    Benign essential hypertension 2022    Family history of ischemic heart disease 2022    Family history of muscular dystrophy 2022     Patient Active Problem List   Diagnosis    Benign essential hypertension    Family history of ischemic heart disease    Family history of muscular dystrophy    Class 2 severe obesity with serious comorbidity and body mass index (BMI) of 38.0 to 38.9 in adult, unspecified obesity type (H)     No current outpatient medications on file.     No current facility-administered medications for this visit.     Social History     Socioeconomic History    Marital status:      Spouse name: Not on file    Number of children: Not on file    Years of education: Not on file    Highest education level: Not on file   Occupational History    Not on file   Tobacco Use    Smoking status: Never    Smokeless tobacco: Never   Vaping Use    Vaping Use: Never used   Substance and Sexual Activity    Alcohol use: Yes     Comment: 4 drinks a year    Drug use: Never    Sexual activity: Yes     Partners: Male     Birth control/protection: Condom   Other Topics Concern    Parent/sibling w/ CABG, MI or angioplasty before 65F 55M? Yes     Comment: Father  at 50   Social History Narrative    Not on file     Social Determinants of Health     Financial Resource Strain: Low Risk  (2022)    Overall Financial  Resource Strain (CARDIA)     Difficulty of Paying Living Expenses: Not hard at all   Food Insecurity: No Food Insecurity (6/30/2022)    Hunger Vital Sign     Worried About Running Out of Food in the Last Year: Never true     Ran Out of Food in the Last Year: Never true   Transportation Needs: No Transportation Needs (6/30/2022)    PRAPARE - Transportation     Lack of Transportation (Medical): No     Lack of Transportation (Non-Medical): No   Physical Activity: Insufficiently Active (6/30/2022)    Exercise Vital Sign     Days of Exercise per Week: 3 days     Minutes of Exercise per Session: 10 min   Stress: No Stress Concern Present (6/30/2022)    [employer] - Occupational Stress Questionnaire     Feeling of Stress : Not at all   Social Connections: Moderately Isolated (6/30/2022)    Social Connection and Isolation Panel [NHANES]     Frequency of Communication with Friends and Family: More than three times a week     Frequency of Social Gatherings with Friends and Family: Once a week     Attends Jew Services: Never     Active Member of Clubs or Organizations: No     Attends Club or Organization Meetings: Not on file     Marital Status:    Intimate Partner Violence: Not on file   Housing Stability: Low Risk  (6/30/2022)    Housing Stability Vital Sign     Unable to Pay for Housing in the Last Year: No     Number of Places Lived in the Last Year: 1     Unstable Housing in the Last Year: No     ROS negative other than stated above    Exam:  GENERAL APPEARANCE: healthy, alert and no distress  EYES: EOMI,  PERRL  HENT: TMs and canals clear bilaterally.  Oral mucosa moist with erythema noted.  There is no exudate and uvula is midline with no evidence for abscess.  Handling oral secretions well.  No significant nasal congestion noted  NECK: bilateral anterior cervical adenopathy  RESP: lungs clear to auscultation - no rales, rhonchi or wheezes  CV: regular rates and rhythm, normal  S1 S2, no S3 or S4 and no murmur, click or rub -  SKIN: Fine macular papular rash on the abdomen sandpaper texture with consistence with strep rash    Results for orders placed or performed in visit on 09/02/23   Streptococcus A Rapid Screen w/Reflex to PCR - Clinic Collect     Status: Abnormal    Specimen: Throat; Swab   Result Value Ref Range    Group A Strep antigen Positive (A) Negative     assessment/plan:  (J02.0) Strep throat  (primary encounter diagnosis)  Comment:   Plan: amoxicillin (AMOXIL) 875 MG tablet        Patient with a 5-day history of sore throat.  Today she developed a rash.  There is no evidence for abscess.  She did test positive for strep.  Continue with over-the-counter med if needed for symptomatic relief.  Patient is contagious for 24 hours and will change toothbrush in 2 days.  Follow-up with primary if symptoms worsen or new symptoms develop    (R07.0) Throat pain  Comment:   Plan: Streptococcus A Rapid Screen w/Reflex to PCR -         Clinic Collect

## 2023-12-01 ENCOUNTER — OFFICE VISIT (OUTPATIENT)
Dept: FAMILY MEDICINE | Facility: CLINIC | Age: 38
End: 2023-12-01
Payer: COMMERCIAL

## 2023-12-01 VITALS
HEART RATE: 99 BPM | SYSTOLIC BLOOD PRESSURE: 131 MMHG | BODY MASS INDEX: 32.14 KG/M2 | HEIGHT: 66 IN | WEIGHT: 200 LBS | RESPIRATION RATE: 15 BRPM | OXYGEN SATURATION: 97 % | TEMPERATURE: 98.6 F | DIASTOLIC BLOOD PRESSURE: 85 MMHG

## 2023-12-01 DIAGNOSIS — N84.0 UTERINE POLYP: ICD-10-CM

## 2023-12-01 DIAGNOSIS — Z01.818 PREOP GENERAL PHYSICAL EXAM: Primary | ICD-10-CM

## 2023-12-01 DIAGNOSIS — J02.9 PHARYNGITIS, UNSPECIFIED ETIOLOGY: ICD-10-CM

## 2023-12-01 LAB
ERYTHROCYTE [DISTWIDTH] IN BLOOD BY AUTOMATED COUNT: 12.6 % (ref 10–15)
HCT VFR BLD AUTO: 42.1 % (ref 35–47)
HGB BLD-MCNC: 14 G/DL (ref 11.7–15.7)
MCH RBC QN AUTO: 29.1 PG (ref 26.5–33)
MCHC RBC AUTO-ENTMCNC: 33.3 G/DL (ref 31.5–36.5)
MCV RBC AUTO: 88 FL (ref 78–100)
PLATELET # BLD AUTO: 172 10E3/UL (ref 150–450)
RBC # BLD AUTO: 4.81 10E6/UL (ref 3.8–5.2)
WBC # BLD AUTO: 7.2 10E3/UL (ref 4–11)

## 2023-12-01 PROCEDURE — 99214 OFFICE O/P EST MOD 30 MIN: CPT | Performed by: FAMILY MEDICINE

## 2023-12-01 PROCEDURE — 36415 COLL VENOUS BLD VENIPUNCTURE: CPT | Performed by: FAMILY MEDICINE

## 2023-12-01 PROCEDURE — 85027 COMPLETE CBC AUTOMATED: CPT | Performed by: FAMILY MEDICINE

## 2023-12-01 PROCEDURE — 87635 SARS-COV-2 COVID-19 AMP PRB: CPT | Performed by: FAMILY MEDICINE

## 2023-12-01 ASSESSMENT — PAIN SCALES - GENERAL: PAINLEVEL: NO PAIN (0)

## 2023-12-01 NOTE — PATIENT INSTRUCTIONS
Preparing for Your Surgery  Getting started  A nurse will call you to review your health history and instructions. They will give you an arrival time based on your scheduled surgery time. Please be ready to share:  Your doctor's clinic name and phone number  Your medical, surgical, and anesthesia history  A list of allergies and sensitivities  A list of medicines, including herbal treatments and over-the-counter drugs  Whether the patient has a legal guardian (ask how to send us the papers in advance)  Please tell us if you're pregnant--or if there's any chance you might be pregnant. Some surgeries may injure a fetus (unborn baby), so they require a pregnancy test. Surgeries that are safe for a fetus don't always need a test, and you can choose whether to have one.   If you have a child who's having surgery, please ask for a copy of Preparing for Your Child's Surgery.    Preparing for surgery  Within 10 to 30 days of surgery: Have a pre-op exam (sometimes called an H&P, or History and Physical). This can be done at a clinic or pre-operative center.  If you're having a , you may not need this exam. Talk to your care team.  At your pre-op exam, talk to your care team about all medicines you take. If you need to stop any medicines before surgery, ask when to start taking them again.  We do this for your safety. Many medicines can make you bleed too much during surgery. Some change how well surgery (anesthesia) drugs work.  Call your insurance company to let them know you're having surgery. (If you don't have insurance, call [phone].)  Call your clinic if there's any change in your health. This includes signs of a cold or flu (sore throat, runny nose, cough, rash, fever). It also includes a scrape or scratch near the surgery site.  If you have questions on the day of surgery, call your hospital or surgery center.  Eating and drinking guidelines  For your safety: Unless your surgeon tells you otherwise,  follow the guidelines below.  Eat and drink as usual until 8 hours before you arrive for surgery. After that, no food or milk.  Drink clear liquids until 2 hours before you arrive. These are liquids you can see through, like water, Gatorade, and Propel Water. They also include plain black coffee and tea (no cream or milk), candy, and breath mints. You can spit out gum when you arrive.  If you drink alcohol: Stop drinking it the night before surgery.  If your care team tells you to take medicine on the morning of surgery, it's okay to take it with a sip of water.  Preventing infection  Shower or bathe the night before and morning of your surgery. Follow the instructions your clinic gave you. (If no instructions, use regular soap.)  Don't shave or clip hair near your surgery site. We'll remove the hair if needed.  Don't smoke or vape the morning of surgery. You may chew nicotine gum up to 2 hours before surgery. A nicotine patch is okay.  Note: Some surgeries require you to completely quit smoking and nicotine. Check with your surgeon.  Your care team will make every effort to keep you safe from infection. We will:  Clean our hands often with soap and water (or an alcohol-based hand rub).  Clean the skin at your surgery site with a special soap that kills germs.  Give you a special gown to keep you warm. (Cold raises the risk of infection.)  Wear special hair covers, masks, gowns and gloves during surgery.  Give antibiotic medicine, if prescribed. Not all surgeries need antibiotics.  What to bring on the day of surgery  Photo ID and insurance card  Copy of your health care directive, if you have one  Glasses and hearing aids (bring cases)  You can't wear contacts during surgery  Inhaler and eye drops, if you use them (tell us about these when you arrive)  CPAP machine or breathing device, if you use them  A few personal items, if spending the night  If you have . . .  A pacemaker, ICD (cardiac defibrillator) or other  implant: Bring the ID card.  An implanted stimulator: Bring the remote control.  A legal guardian: Bring a copy of the certified (court-stamped) guardianship papers.  Please remove any jewelry, including body piercings. Leave jewelry and other valuables at home.  If you're going home the day of surgery  You must have a responsible adult drive you home. They should stay with you overnight as well.  If you don't have someone to stay with you, and you aren't safe to go home alone, we may keep you overnight. Insurance often won't pay for this.  After surgery  If it's hard to control your pain or you need more pain medicine, please call your surgeon's office.  Questions?   If you have any questions for your care team, list them here: _________________________________________________________________________________________________________________________________________________________________________ ____________________________________ ____________________________________ ____________________________________  For informational purposes only. Not to replace the advice of your health care provider. Copyright   2003, 2019 [employer]. All rights reserved. Clinically reviewed by Mali Mortensen MD. [employer] 252752 - REV 12/22.    How to Take Your Medication Before Surgery  - STOP taking all vitamins and herbal supplements 14 days before surgery.

## 2023-12-01 NOTE — PROGRESS NOTES
[facility]  [address] [address] [address]  Phone: [phone]  Primary Provider: Aaseby-Aguilera, Ramona Ann  Pre-op Performing Provider: STANLEY CESPEDES      PREOPERATIVE EVALUATION:  Today's date: 12/1/2023    Joanie is a 38 year old, presenting for the following:  Pre-Op Exam        12/1/2023     8:09 AM   Additional Questions   Roomed by Stephanie MENDOZA       Surgical Information:  Surgery/Procedure: polyps removal from uterus   Surgery Location: [facility]   Surgeon: Dr. Carleen Deleon  Surgery Date: 12/4  Time of Surgery: afternoon (3:30pm)  Where patient plans to recover: At home with family  Fax number for surgical facility: [phone]    Assessment and Plan    (Z01.818) Preop general physical exam  (primary encounter diagnosis)  Comment: cleared for surgery pending negative COVID test  Plan: CBC with platelets, CANCELED: Basic metabolic         panel  (Ca, Cl, CO2, Creat, Gluc, K, Na, BUN)            (N84.0) Uterine polyp  Comment:   Plan:     (J02.9) Pharyngitis, unspecified etiology  Comment:   Plan: Symptomatic COVID-19 Virus (Coronavirus) by PCR        Nose              RTC in 1m PRN    Stanley Cespedes MD      Subjective       HPI related to upcoming procedure: Uterine polyp.  Woke this am with a sore throat.  No other notable symptoms, doesn't even feel terribly ill.  No fever, congestion, cough, GI complaints.  No notable sick contacts.   age son in  and , but not distinctly unwell.  Last had COVID over a year.        11/30/2023     9:58 AM   Preop Questions   1. Have you ever had a heart attack or stroke? No   2. Have you ever had surgery on your heart or blood vessels, such as a stent placement, a coronary artery bypass, or surgery on an artery in your head, neck, heart, or legs? No   3. Do you have chest pain with activity? No   4. Do you have a history of  heart failure? No   5. Do you currently have a cold, bronchitis  or symptoms of other infection? No   6. Do you have a cough, shortness of breath, or wheezing? No   7. Do you or anyone in your family have previous history of blood clots? No   8. Do you or does anyone in your family have a serious bleeding problem such as prolonged bleeding following surgeries or cuts? No   9. Have you ever had problems with anemia or been told to take iron pills? No   10. Have you had any abnormal blood loss such as black, tarry or bloody stools, or abnormal vaginal bleeding? No   11. Have you ever had a blood transfusion? No   12. Are you willing to have a blood transfusion if it is medically needed before, during, or after your surgery? NO -    13. Have you or any of your relatives ever had problems with anesthesia? No   14. Do you have sleep apnea, excessive snoring or daytime drowsiness? No   15. Do you have any artifical heart valves or other implanted medical devices like a pacemaker, defibrillator, or continuous glucose monitor? No   16. Do you have artificial joints? No   17. Are you allergic to latex? No   18. Is there any chance that you may be pregnant? No       Health Care Directive:  Patient does not have a Health Care Directive or Living Will:     Preoperative Review of :   reviewed - controlled substances prescribed by other outside provider(s).      Status of Chronic Conditions:  See problem list for active medical problems.  Problems all longstanding and stable, except as noted/documented.  See ROS for pertinent symptoms related to these conditions.    Review of Systems  CONSTITUTIONAL: NEGATIVE for fever, chills, change in weight  INTEGUMENTARY/SKIN: NEGATIVE for worrisome rashes, moles or lesions  EYES: NEGATIVE for vision changes or irritation  ENT/MOUTH: NEGATIVE for ear, mouth and throat problems  RESP: NEGATIVE for significant cough or SOB  CV: NEGATIVE for chest pain, palpitations or peripheral edema  GI: NEGATIVE for nausea, abdominal pain, heartburn, or change in  "bowel habits  : NEGATIVE for frequency, dysuria, or hematuria  MUSCULOSKELETAL: NEGATIVE for significant arthralgias or myalgia  NEURO: NEGATIVE for weakness, dizziness or paresthesias  ENDOCRINE: NEGATIVE for temperature intolerance, skin/hair changes  HEME: NEGATIVE for bleeding problems  PSYCHIATRIC: NEGATIVE for changes in mood or affect    Patient Active Problem List    Diagnosis Date Noted    Class 2 severe obesity with serious comorbidity and body mass index (BMI) of 38.0 to 38.9 in adult, unspecified obesity type (H) 2023     Priority: Medium    Benign essential hypertension 2022     Priority: Medium    Family history of ischemic heart disease 2022     Priority: Medium    Family history of muscular dystrophy 2022     Priority: Medium      Past Medical History:   Diagnosis Date    Anxiety 2022    Benign essential hypertension 2022    Family history of ischemic heart disease 2022    Family history of muscular dystrophy 2022     Past Surgical History:   Procedure Laterality Date    ABDOMEN SURGERY  2019        ENT SURGERY      I was  5 pr 6. Tubes    NO HISTORY OF SURGERY       No current outpatient medications on file.       No Known Allergies     Social History     Tobacco Use    Smoking status: Never    Smokeless tobacco: Never   Substance Use Topics    Alcohol use: Yes     Comment: 4 drinks a year       History   Drug Use Unknown         Objective     /85 (BP Location: Right arm, Patient Position: Sitting, Cuff Size: Adult Large)   Pulse 99   Temp 98.6  F (37  C) (Oral)   Resp 15   Ht 1.67 m (5' 5.75\")   Wt 90.7 kg (200 lb)   LMP  (LMP Unknown)   SpO2 97%   BMI 32.53 kg/m      Physical Exam  Vitals and nursing note reviewed.   Constitutional:       Appearance: Normal appearance.   HENT:      Head: Normocephalic.      Right Ear: Tympanic membrane, ear canal and external ear normal.      Left Ear: Tympanic membrane, ear canal and " external ear normal.      Mouth/Throat:      Mouth: Mucous membranes are moist.      Pharynx: Oropharynx is clear.   Eyes:      Extraocular Movements: Extraocular movements intact.      Conjunctiva/sclera: Conjunctivae normal.      Pupils: Pupils are equal, round, and reactive to light.   Neck:      Thyroid: No thyroid mass or thyromegaly.   Cardiovascular:      Rate and Rhythm: Normal rate and regular rhythm.   Pulmonary:      Effort: Pulmonary effort is normal.      Breath sounds: Normal breath sounds.   Abdominal:      General: Bowel sounds are normal.      Palpations: Abdomen is soft. There is no mass.      Tenderness: There is no abdominal tenderness.   Musculoskeletal:         General: Normal range of motion.   Lymphadenopathy:      Cervical: No cervical adenopathy.   Skin:     General: Skin is warm and dry.   Neurological:      General: No focal deficit present.      Mental Status: She is alert and oriented to person, place, and time.   Psychiatric:         Mood and Affect: Mood normal.         Behavior: Behavior normal.           Recent Labs   Lab Test 06/20/23  1413 01/25/23  1112 04/08/22  1700   HGB  --   --  14.4   PLT  --   --  209    138 137   POTASSIUM 4.6 3.8 3.9   CR 0.75 0.80 0.79        Diagnostics:  Labs pending at this time.  Results will be reviewed when available.   No EKG required, no history of coronary heart disease, significant arrhythmia, peripheral arterial disease or other structural heart disease.    Revised Cardiac Risk Index (RCRI):  The patient has the following serious cardiovascular risks for perioperative complications:   - No serious cardiac risks = 0 points     RCRI Interpretation: 0 points: Class I (very low risk - 0.4% complication rate)         Signed Electronically by: Stanley Kelly MD  Copy of this evaluation report is provided to requesting physician.

## 2023-12-02 ENCOUNTER — OFFICE VISIT (OUTPATIENT)
Dept: URGENT CARE | Facility: URGENT CARE | Age: 38
End: 2023-12-02
Payer: COMMERCIAL

## 2023-12-02 VITALS
SYSTOLIC BLOOD PRESSURE: 108 MMHG | OXYGEN SATURATION: 99 % | HEART RATE: 94 BPM | TEMPERATURE: 98.2 F | DIASTOLIC BLOOD PRESSURE: 62 MMHG | RESPIRATION RATE: 16 BRPM

## 2023-12-02 DIAGNOSIS — J02.0 STREP THROAT: Primary | ICD-10-CM

## 2023-12-02 DIAGNOSIS — R07.0 THROAT PAIN: ICD-10-CM

## 2023-12-02 LAB
DEPRECATED S PYO AG THROAT QL EIA: POSITIVE
SARS-COV-2 RNA RESP QL NAA+PROBE: NEGATIVE

## 2023-12-02 PROCEDURE — 99213 OFFICE O/P EST LOW 20 MIN: CPT | Performed by: PHYSICIAN ASSISTANT

## 2023-12-02 PROCEDURE — 87880 STREP A ASSAY W/OPTIC: CPT | Performed by: PHYSICIAN ASSISTANT

## 2023-12-02 RX ORDER — AMOXICILLIN 500 MG/1
500 CAPSULE ORAL 2 TIMES DAILY
Qty: 20 CAPSULE | Refills: 0 | Status: SHIPPED | OUTPATIENT
Start: 2023-12-02 | End: 2023-12-12

## 2023-12-02 RX ORDER — DESOGESTREL AND ETHINYL ESTRADIOL 0.15-0.03
1 KIT ORAL DAILY
COMMUNITY
Start: 2023-12-02 | End: 2024-08-05

## 2023-12-02 NOTE — PATIENT INSTRUCTIONS
December 2, 2023 Urgent Care Plan         - Amoxicillin antibiotic for Strep   -Home supportive care   -Because you have a pending surgical procedure, stay with Tylenol only (no Ibuprofen/Motrin/Advil/Aleve). Read dosing directions carefully and do not take more than 3,000 mg of Tylenol in 24 hours.   -Encourage extra fluids   -Follow-up with primary care or urgent care if no improvement after 3-4 days of antibiotics, if not fully resolved in 10 days, and sooner if worsening.   -Change toothbrush as discussed to prevent re-infection    -Contact your surgical team for instructions regarding your Monday surgical procedure     -Specifically ask your surgeon if they want you to take your morning dose of Amoxicillin prior to your surgery

## 2023-12-02 NOTE — PROGRESS NOTES
ASSESSMENT/PLAN:       (J02.0) Strep throat  (primary encounter diagnosis)  MDM: Acute strep throat and 38-year-old female awaiting surgical procedure in 2 days.  Please see below discharge summary/plan (which I reviewed with patient in detail today verbally and provided in her MyChart for home review).  Plan: amoxicillin (AMOXIL) 500 MG capsule              December 2, 2023 Urgent Care Plan         - Amoxicillin antibiotic for Strep   -Home supportive care   -Because you have a pending surgical procedure, stay with Tylenol only (no Ibuprofen/Motrin/Advil/Aleve). Read dosing directions carefully and do not take more than 3,000 mg of Tylenol in 24 hours.   -Encourage extra fluids   -Follow-up with primary care or urgent care if no improvement after 3-4 days of antibiotics, if not fully resolved in 10 days, and sooner if worsening.   -Change toothbrush as discussed to prevent re-infection    -Contact your surgical team for instructions regarding your Monday surgical procedure     -Specifically ask your surgeon if they want you to take your morning dose of Amoxicillin prior to your surgery        (R07.0) Throat pain  Plan: Streptococcus A Rapid Screen w/Reflex to PCR -         Clinic Collect    This progress note has been dictated, with use of voice recognition software. Any grammatical, typographical, or context errors are unintentional and inherent to use of voice recognition software.  ---------------------      Chief Complaint   Patient presents with    Pharyngitis     Yesterday, son test pos for strep         SUBJECTIVE:     Nevaeh Marshall 38 year old female who presents to  today for evaluation of sore throat x 1 day duration.  Son reportedly tested positive for strep throat yesterday. Patient confirms she is still able to take in good fluids and soft food despite sore throat.      Of note: Patient tells me she is awaiting a surgical procedure (uterine polyp removal) on Monday (2 days from now).  By  "her report, she was told she is not going to have a \"breathing tube\".  At her preop yesterday, primary care provider ordered COVID screening due to her sore throat, but did not order strep screening.      ROS: No associated fever, chills, cough, shortness of breath, wheezing,  abdominal pain, nausea, vomiting, diarrhea, body aches, headaches, rashes, joint swelling or other acute illness symptoms.           Past Medical History:   Diagnosis Date    Anxiety 08/02/2022    Benign essential hypertension 08/02/2022    Family history of ischemic heart disease 08/02/2022    Family history of muscular dystrophy 08/02/2022       Patient Active Problem List   Diagnosis    Benign essential hypertension    Family history of ischemic heart disease    Family history of muscular dystrophy    Class 2 severe obesity with serious comorbidity and body mass index (BMI) of 38.0 to 38.9 in adult, unspecified obesity type (H)         Current Outpatient Medications   Medication    desogestrel-ethinyl estradiol (APRI) 0.15-30 MG-MCG tablet     No current facility-administered medications for this visit.       No Known Allergies        OBJECTIVE:  /62   Pulse 94   Temp 98.2  F (36.8  C)   Resp 16   LMP  (LMP Unknown)   SpO2 99%           General appearance: alert and no apparent distress  Skin color is uniform in color and without rash.  HEENT:   Conjunctiva not injected.  Sclera clear.  Left TM is normal: no effusions, no erythema, and normal landmarks.  Right TM is normal: no effusions, no erythema, and normal landmarks.  Nasal mucosa is normal.  Oropharyngeal exam is positive for mild to moderate, generalized, posterior pharyngeal erythema.  No asymmetry. Uvula is midline. No trismus. Voice is clear. No lesions, adenopathy, plaque or exudate.  Neck is supple, FROM. No neck stiffness. No adenopathy  CARDIAC:NORMAL - regular rate and rhythm without murmur.  RESP: No increased work of breathing. No retractions. No stridor. Lung " fields are clear to ausculation. No rales, rhonchi, or wheezing.  NEURO: Alert and oriented.  Normal speech and mentation.  CN II/XII grossly intact.  Gait within normal limits.       Results for orders placed or performed in visit on 12/02/23   Streptococcus A Rapid Screen w/Reflex to PCR - Clinic Collect     Status: Abnormal    Specimen: Throat; Swab   Result Value Ref Range    Group A Strep antigen Positive (A) Negative

## 2023-12-28 ENCOUNTER — OFFICE VISIT (OUTPATIENT)
Dept: URGENT CARE | Facility: URGENT CARE | Age: 38
End: 2023-12-28
Payer: COMMERCIAL

## 2023-12-28 VITALS
TEMPERATURE: 98 F | OXYGEN SATURATION: 98 % | DIASTOLIC BLOOD PRESSURE: 80 MMHG | SYSTOLIC BLOOD PRESSURE: 122 MMHG | RESPIRATION RATE: 20 BRPM | HEART RATE: 80 BPM

## 2023-12-28 DIAGNOSIS — J02.9 SORE THROAT: ICD-10-CM

## 2023-12-28 DIAGNOSIS — J02.0 ACUTE STREPTOCOCCAL PHARYNGITIS: Primary | ICD-10-CM

## 2023-12-28 LAB — DEPRECATED S PYO AG THROAT QL EIA: POSITIVE

## 2023-12-28 PROCEDURE — 99213 OFFICE O/P EST LOW 20 MIN: CPT | Performed by: FAMILY MEDICINE

## 2023-12-28 PROCEDURE — 87880 STREP A ASSAY W/OPTIC: CPT

## 2023-12-28 RX ORDER — PENICILLIN V POTASSIUM 500 MG/1
500 TABLET, FILM COATED ORAL 2 TIMES DAILY
Qty: 20 TABLET | Refills: 0 | Status: SHIPPED | OUTPATIENT
Start: 2023-12-28 | End: 2024-01-07

## 2023-12-28 NOTE — PROGRESS NOTES
SUBJECTIVE:   Nevaeh Marshall is a 38 year old female presenting with a chief complaint of sore throat.  No fever.  Feels like a tonsil stone.  Onset of symptoms was 1 day(s) ago.  Course of illness is worsening.    Severity moderate  Current and Associated symptoms: sore throat  Treatment measures tried include Fluids and Rest.  Predisposing factors include ill contact: Family member .    Whole family has been sick this month, patient had strep earlier in the month.  Family positive for COVID and thinks that she did have COVID though tested negative.  Sinus congestion and cough was worse but this did improve.      Past Medical History:   Diagnosis Date    Anxiety 08/02/2022    Benign essential hypertension 08/02/2022    Family history of ischemic heart disease 08/02/2022    Family history of muscular dystrophy 08/02/2022     Current Outpatient Medications   Medication Sig Dispense Refill    desogestrel-ethinyl estradiol (APRI) 0.15-30 MG-MCG tablet Take 1 tablet by mouth daily       Social History     Tobacco Use    Smoking status: Never    Smokeless tobacco: Never   Substance Use Topics    Alcohol use: Yes     Comment: 4 drinks a year       ROS:  Review of systems negative except as stated above.    OBJECTIVE:  /80   Pulse 80   Temp 98  F (36.7  C) (Tympanic)   Resp 20   LMP  (LMP Unknown)   SpO2 98%   GENERAL APPEARANCE: healthy, alert and no distress  EYES: EOMI,  PERRL, conjunctiva clear  HENT: pharynx with mildly enlarges tonsils, pink, no exudates, uvula midline  RESP: lungs with no audible wheezes or increase work of breathing  PSYCH: mentation appears normal and affect normal/bright    Results for orders placed or performed in visit on 12/28/23   Streptococcus A Rapid Screen w/Reflex to PCR     Status: Abnormal    Specimen: Throat; Swab   Result Value Ref Range    Group A Strep antigen Positive (A) Negative       ASSESSMENT/PLAN:  (J02.0) Acute streptococcal pharyngitis  (primary encounter  diagnosis)  Plan: penicillin V (VEETID) 500 MG tablet            (J02.9) Sore throat  Plan: Streptococcus A Rapid Screen w/Reflex to PCR            RX Pen V given for treatment, reviewed that is still contagious for the next 24-48 hours while on antibiotic, obtain new toothbrush in 2 days.  Reviewed symptomatic treatment with tylenol, ibuprofen, plenty of fluids and rest.    Follow up with primary provider if no improvement of symptoms in 1 week    Alexandre Reno MD  December 28, 2023 2:46 PM

## 2024-01-15 ENCOUNTER — OFFICE VISIT (OUTPATIENT)
Dept: URGENT CARE | Facility: URGENT CARE | Age: 39
End: 2024-01-15
Payer: COMMERCIAL

## 2024-01-15 VITALS
OXYGEN SATURATION: 98 % | HEART RATE: 92 BPM | SYSTOLIC BLOOD PRESSURE: 119 MMHG | DIASTOLIC BLOOD PRESSURE: 80 MMHG | WEIGHT: 209.6 LBS | BODY MASS INDEX: 34.09 KG/M2 | TEMPERATURE: 97.9 F

## 2024-01-15 DIAGNOSIS — J02.9 SORE THROAT: Primary | ICD-10-CM

## 2024-01-15 LAB — DEPRECATED S PYO AG THROAT QL EIA: NEGATIVE

## 2024-01-15 PROCEDURE — 99213 OFFICE O/P EST LOW 20 MIN: CPT | Performed by: FAMILY MEDICINE

## 2024-01-15 PROCEDURE — 87651 STREP A DNA AMP PROBE: CPT | Performed by: FAMILY MEDICINE

## 2024-01-15 NOTE — PROGRESS NOTES
Assessment & Plan     Sore throat  - Streptococcus A Rapid Screen w/Reflex to PCR  - Group A Streptococcus PCR Throat Swab     Exam as documented below there is no evidence of peritonsillar abscess at this time.  Continue symptomatic treatment.  Strep PCR is pending at this time.    Chip Hudson MD   [facility]    Parveen Nair is a 38 year old female who presents to clinic today for the following health issues:  Chief Complaint   Patient presents with    Pharyngitis     Start ongoing since 1/8/24 sx sore throat, burning and scratchy, dull pain tx recently completed Penicillin regiment      HPI      Patient seen 12/28/23 prescribed penicillin for strep infection  --she never saw complete resolution of her symptoms.  She been out of the week before this illness her son did come down with COVID but she did not test positive.  She has not had any runny nose or cough recently additionally no fevers.  No neck masses.  No difficulty with opening her mouth.  Continues to have this throat irritation thus wanted to come in to be screened for strep again.    New sore throat developing on 1/8 present now 1 week thus she returns for evaluation    Patient Active Problem List    Diagnosis Date Noted    Class 2 severe obesity with serious comorbidity and body mass index (BMI) of 38.0 to 38.9 in adult, unspecified obesity type (H) 06/20/2023     Priority: Medium    Benign essential hypertension 08/02/2022     Priority: Medium    Family history of ischemic heart disease 08/02/2022     Priority: Medium    Family history of muscular dystrophy 08/02/2022     Priority: Medium       Current Outpatient Medications   Medication    desogestrel-ethinyl estradiol (APRI) 0.15-30 MG-MCG tablet     No current facility-administered medications for this visit.           Objective    /80   Pulse 92   Temp 97.9  F (36.6  C)   Wt 95.1 kg (209 lb 9.6 oz)   LMP  (LMP Unknown)   SpO2 98%   BMI 34.09 kg/m    Physical  Exam       Throat: 3+ tonsils, 2+ mallampati   CV: HDS  Pulm: non-labored  GEN: appears age, no distress  Results for orders placed or performed in visit on 01/15/24   Streptococcus A Rapid Screen w/Reflex to PCR     Status: Normal    Specimen: Throat; Swab   Result Value Ref Range    Group A Strep antigen Negative Negative                     The use of [employer] dictation services may have been used to construct the content in this note; any grammatical or spelling errors are non-intentional. Please contact the author of this note directly if you are in need of any clarification.

## 2024-01-16 LAB — GROUP A STREP BY PCR: NOT DETECTED

## 2024-02-11 ENCOUNTER — HEALTH MAINTENANCE LETTER (OUTPATIENT)
Age: 39
End: 2024-02-11

## 2024-02-15 ENCOUNTER — TRANSFERRED RECORDS (OUTPATIENT)
Dept: HEALTH INFORMATION MANAGEMENT | Facility: CLINIC | Age: 39
End: 2024-02-15
Payer: COMMERCIAL

## 2024-04-06 ENCOUNTER — OFFICE VISIT (OUTPATIENT)
Dept: URGENT CARE | Facility: URGENT CARE | Age: 39
End: 2024-04-06
Payer: COMMERCIAL

## 2024-04-06 VITALS
HEART RATE: 98 BPM | TEMPERATURE: 98.3 F | DIASTOLIC BLOOD PRESSURE: 84 MMHG | OXYGEN SATURATION: 99 % | RESPIRATION RATE: 14 BRPM | SYSTOLIC BLOOD PRESSURE: 127 MMHG

## 2024-04-06 DIAGNOSIS — R07.0 THROAT PAIN: ICD-10-CM

## 2024-04-06 DIAGNOSIS — J02.0 ACUTE STREPTOCOCCAL PHARYNGITIS: Primary | ICD-10-CM

## 2024-04-06 LAB — DEPRECATED S PYO AG THROAT QL EIA: POSITIVE

## 2024-04-06 PROCEDURE — 87880 STREP A ASSAY W/OPTIC: CPT | Performed by: FAMILY MEDICINE

## 2024-04-06 PROCEDURE — 99213 OFFICE O/P EST LOW 20 MIN: CPT | Performed by: FAMILY MEDICINE

## 2024-04-06 RX ORDER — ESTRADIOL 2 MG/1
TABLET ORAL
COMMUNITY
Start: 2023-09-12 | End: 2024-08-05

## 2024-04-06 RX ORDER — NEEDLES, DISPOSABLE 25GX5/8"
NEEDLE, DISPOSABLE MISCELLANEOUS
COMMUNITY
Start: 2024-03-18 | End: 2024-08-05

## 2024-04-06 RX ORDER — LEUPROLIDE ACETATE 1 MG/0.2ML
KIT SUBCUTANEOUS
COMMUNITY
Start: 2023-09-11 | End: 2024-08-05

## 2024-04-06 RX ORDER — SYRINGE, DISPOSABLE, 1 ML
SYRINGE, EMPTY DISPOSABLE MISCELLANEOUS
COMMUNITY
Start: 2024-03-18 | End: 2024-08-05

## 2024-04-06 RX ORDER — PENICILLIN V POTASSIUM 500 MG/1
500 TABLET, FILM COATED ORAL 2 TIMES DAILY
Qty: 20 TABLET | Refills: 0 | Status: SHIPPED | OUTPATIENT
Start: 2024-04-06 | End: 2024-04-16

## 2024-04-06 RX ORDER — PROGESTERONE 50 MG/ML
INJECTION, SOLUTION INTRAMUSCULAR
COMMUNITY
End: 2024-08-05

## 2024-04-06 RX ORDER — DEXAMETHASONE SODIUM PHOSPHATE 4 MG/ML
INJECTION, SOLUTION INTRAMUSCULAR; INTRAVENOUS SEE ADMIN INSTRUCTIONS
COMMUNITY
Start: 2024-03-18 | End: 2024-08-05

## 2024-04-06 RX ORDER — NAPROXEN SODIUM 220 MG
TABLET ORAL
COMMUNITY
Start: 2023-09-11 | End: 2024-08-05

## 2024-04-06 NOTE — PROGRESS NOTES
"SUBJECTIVE:   Nevaeh Marshall is a 38 year old female presenting with a chief complaint of sore throat.  Onset of symptoms was 3 day(s) ago.  Course of illness is worsening.    Severity moderate  Current and Associated symptoms: sore throat  Treatment measures tried include Fluids and Rest.  Predisposing factors include None.    Currently pregnant  4 year old son has a cough    Past Medical History:   Diagnosis Date    Anxiety 08/02/2022    Benign essential hypertension 08/02/2022    Family history of ischemic heart disease 08/02/2022    Family history of muscular dystrophy 08/02/2022     Current Outpatient Medications   Medication Sig Dispense Refill    B-D BLUNT FILL NEEDLE 18G X 1-1/2\" MISC USE TO WITHDRAW PROGESTERONE IN OIL      BD DISP NEEDLES 21G X 1-1/2\" MISC USE TO INJECT PROGESTERONE IN OIL      BD PLASTIPAK SYRINGE 3 ML MISC USE TO INJECT PROGESTERONE IN OIL      BD [employer] MISC See Admin Instructions      estradiol (ESTRACE) 2 MG tablet       Insulin Syringe 30G X 5/16\" 0.5 ML MISC USE TO INJECT LEUPROLIDE      progesterone, in sesame oil, 50 MG/ML injection ADMINISTER 1.5 ML IN THE MUSCLE EVERY DAY AS DIRECTED      desogestrel-ethinyl estradiol (APRI) 0.15-30 MG-MCG tablet Take 1 tablet by mouth daily (Patient not taking: Reported on 4/6/2024)      leuprolide acetate (LUPRON) 1 MG/0.2ML kit  (Patient not taking: Reported on 4/6/2024)       Social History     Tobacco Use    Smoking status: Never    Smokeless tobacco: Never   Substance Use Topics    Alcohol use: Yes     Comment: 4 drinks a year       ROS:  Review of systems negative except as stated above.    OBJECTIVE:  /84   Pulse 98   Temp 98.3  F (36.8  C)   Resp 14   SpO2 99%   GENERAL APPEARANCE: healthy, alert and no distress  EYES: EOMI,  PERRL, conjunctiva clear  HENT: mouth without ulcers, erythema or lesions, pharynx with mildly enlarge tonsils with no exudates, uvula midline  RESP: lungs with no audible wheezes or " increase work of breathing  PSYCH: mentation appears normal and affect normal/bright    Results for orders placed or performed in visit on 04/06/24   Streptococcus A Rapid Screen w/Reflex to PCR - Clinic Collect     Status: Abnormal    Specimen: Throat; Swab   Result Value Ref Range    Group A Strep antigen Positive (A) Negative         ASSESSMENT/PLAN:  (J02.0) Acute streptococcal pharyngitis  (primary encounter diagnosis)  Plan: penicillin V (VEETID) 500 MG tablet            (R07.0) Throat pain  Plan: Streptococcus A Rapid Screen w/Reflex to PCR -         Clinic Collect            Reassurance given, reviewed symptomatic treatment with tylenol, ibuprofen, plenty of fluids and rest.  RX Pen V given for treatment of strep throat infection, reviewed that is still contagious for the next 24-48 hours while on antibiotic, obtain new toothbrush in 2 days    Follow up with primary provider if no improvement of symptoms in 1 week    Alexandre Reno MD  April 6, 2024 11:16 AM

## 2024-05-23 ENCOUNTER — PATIENT OUTREACH (OUTPATIENT)
Dept: CARE COORDINATION | Facility: CLINIC | Age: 39
End: 2024-05-23
Payer: COMMERCIAL

## 2024-06-06 ENCOUNTER — PATIENT OUTREACH (OUTPATIENT)
Dept: CARE COORDINATION | Facility: CLINIC | Age: 39
End: 2024-06-06
Payer: COMMERCIAL

## 2024-08-05 ENCOUNTER — OFFICE VISIT (OUTPATIENT)
Dept: FAMILY MEDICINE | Facility: CLINIC | Age: 39
End: 2024-08-05
Payer: COMMERCIAL

## 2024-08-05 VITALS
TEMPERATURE: 97.3 F | DIASTOLIC BLOOD PRESSURE: 80 MMHG | SYSTOLIC BLOOD PRESSURE: 125 MMHG | OXYGEN SATURATION: 98 % | HEART RATE: 99 BPM

## 2024-08-05 DIAGNOSIS — J06.9 BACTERIAL URI: Primary | ICD-10-CM

## 2024-08-05 DIAGNOSIS — J02.9 SORE THROAT: ICD-10-CM

## 2024-08-05 DIAGNOSIS — B96.89 BACTERIAL URI: Primary | ICD-10-CM

## 2024-08-05 LAB — DEPRECATED S PYO AG THROAT QL EIA: NEGATIVE

## 2024-08-05 PROCEDURE — 87651 STREP A DNA AMP PROBE: CPT | Performed by: FAMILY MEDICINE

## 2024-08-05 PROCEDURE — 99213 OFFICE O/P EST LOW 20 MIN: CPT | Performed by: FAMILY MEDICINE

## 2024-08-05 RX ORDER — ASPIRIN 81 MG/1
81 TABLET, CHEWABLE ORAL
COMMUNITY

## 2024-08-05 RX ORDER — AMOXICILLIN 500 MG/1
500 CAPSULE ORAL 3 TIMES DAILY
Qty: 21 CAPSULE | Refills: 0 | Status: SHIPPED | OUTPATIENT
Start: 2024-08-05

## 2024-08-05 ASSESSMENT — PAIN SCALES - GENERAL: PAINLEVEL: MILD PAIN (2)

## 2024-08-05 NOTE — PROGRESS NOTES
"  Assessment & Plan     Sore throat  Is currently at 2nd trimester pregnant with sore throat and exposed by family members who has been treated with abx for streptococcal pharyngitis   Will have her to monitor and start abx if not improving or culture comes back positive   - Streptococcus A Rapid Screen w/Reflex to PCR - Clinic Collect  - Group A Streptococcus PCR Throat Swab    Bacterial URI  Mentioned above   - amoxicillin (AMOXIL) 500 MG capsule; Take 1 capsule (500 mg) by mouth 3 times daily        BMI  Estimated body mass index is 34.09 kg/m  as calculated from the following:    Height as of 12/1/23: 1.67 m (5' 5.75\").    Weight as of 1/15/24: 95.1 kg (209 lb 9.6 oz).   Weight management plan: Discussed healthy diet and exercise guidelines      FUTURE APPOINTMENTS:       - Follow-up visit in 1-2 weeks as needed if not improving     Subjective   Joanie is a 38 year old, presenting for the following health issues:  Throat Problem        8/5/2024     4:00 PM   Additional Questions   Roomed by Kelsi ROGER     History of Present Illness       Reason for visit:  Test for strep  Symptom onset:  1-3 days ago    She eats 2-3 servings of fruits and vegetables daily.She consumes 0 sweetened beverage(s) daily.She exercises with enough effort to increase her heart rate 20 to 29 minutes per day.  She exercises with enough effort to increase her heart rate 3 or less days per week.   She is taking medications regularly.     Pt's has household exposure to strep. 3 family members tested positive within the past week    Acute Illness/ Strep   Acute illness concerns: strep exposure  Onset/Duration: a few days ago  Symptoms:  Fever: No  Chills/Sweats: No  Headache (location?): No  Sinus Pressure: No  Conjunctivitis:  No  Ear Pain: no  Rhinorrhea: No  Congestion: No  Sore Throat: YES- scratchy, inflamed   Cough: no  Wheeze: No  Decreased Appetite: No  Nausea: No  Vomiting: No  Diarrhea: No  Dysuria/Freq.: No  Dysuria or Hematuria: " No  Fatigue/Achiness: No  Sick/Strep Exposure: YES- household   Therapies tried and outcome: None        Review of Systems  Constitutional, HEENT, cardiovascular, pulmonary, GI, , musculoskeletal, neuro, skin, endocrine and psych systems are negative, except as otherwise noted.      Objective    /80 (BP Location: Left arm, Patient Position: Sitting, Cuff Size: Adult Large)   Pulse 99   Temp 97.3  F (36.3  C) (Tympanic)   LMP  (LMP Unknown)   SpO2 98%   There is no height or weight on file to calculate BMI.  Physical Exam   GENERAL: alert and no distress  NECK: no adenopathy, no asymmetry, masses, or scars  RESP: lungs clear to auscultation - no rales, rhonchi or wheezes  CV: regular rate and rhythm, normal S1 S2, no S3 or S4, no murmur, click or rub, no peripheral edema  ABDOMEN: soft, nontender, no hepatosplenomegaly, no masses and bowel sounds normal  MS: no gross musculoskeletal defects noted, no edema            Signed Electronically by: Almas Lozada MD

## 2024-08-06 LAB — GROUP A STREP BY PCR: NOT DETECTED

## 2024-09-08 ENCOUNTER — HEALTH MAINTENANCE LETTER (OUTPATIENT)
Age: 39
End: 2024-09-08

## 2024-12-03 ENCOUNTER — MEDICAL CORRESPONDENCE (OUTPATIENT)
Dept: HEALTH INFORMATION MANAGEMENT | Facility: CLINIC | Age: 39
End: 2024-12-03
Payer: COMMERCIAL

## 2025-05-05 ENCOUNTER — TELEPHONE (OUTPATIENT)
Dept: FAMILY MEDICINE | Facility: CLINIC | Age: 40
End: 2025-05-05
Payer: COMMERCIAL

## 2025-07-13 ENCOUNTER — OFFICE VISIT (OUTPATIENT)
Dept: URGENT CARE | Facility: URGENT CARE | Age: 40
End: 2025-07-13
Payer: COMMERCIAL

## 2025-07-13 VITALS
DIASTOLIC BLOOD PRESSURE: 87 MMHG | OXYGEN SATURATION: 97 % | WEIGHT: 243 LBS | BODY MASS INDEX: 39.52 KG/M2 | TEMPERATURE: 98.7 F | HEART RATE: 107 BPM | SYSTOLIC BLOOD PRESSURE: 129 MMHG | RESPIRATION RATE: 16 BRPM

## 2025-07-13 DIAGNOSIS — R51.9 GENERALIZED HEADACHES: ICD-10-CM

## 2025-07-13 DIAGNOSIS — R52 GENERALIZED BODY ACHES: ICD-10-CM

## 2025-07-13 DIAGNOSIS — R09.81 NASAL CONGESTION: ICD-10-CM

## 2025-07-13 DIAGNOSIS — R05.1 ACUTE COUGH: ICD-10-CM

## 2025-07-13 DIAGNOSIS — R07.0 THROAT PAIN: ICD-10-CM

## 2025-07-13 DIAGNOSIS — J06.9 VIRAL URI WITH COUGH: Primary | ICD-10-CM

## 2025-07-13 LAB — DEPRECATED S PYO AG THROAT QL EIA: NEGATIVE

## 2025-07-13 PROCEDURE — 99213 OFFICE O/P EST LOW 20 MIN: CPT | Performed by: PHYSICIAN ASSISTANT

## 2025-07-13 PROCEDURE — 3074F SYST BP LT 130 MM HG: CPT | Performed by: PHYSICIAN ASSISTANT

## 2025-07-13 PROCEDURE — 87635 SARS-COV-2 COVID-19 AMP PRB: CPT | Performed by: PHYSICIAN ASSISTANT

## 2025-07-13 PROCEDURE — 3079F DIAST BP 80-89 MM HG: CPT | Performed by: PHYSICIAN ASSISTANT

## 2025-07-13 PROCEDURE — 87651 STREP A DNA AMP PROBE: CPT | Performed by: PHYSICIAN ASSISTANT

## 2025-07-13 NOTE — PROGRESS NOTES
Urgent Care Clinic Visit    Chief Complaint   Patient presents with    Pharyngitis     Yesterday, sinus pressure/congestion/nausea               7/13/2025     6:20 PM   Additional Questions   Roomed by matt saldana     Does the patient have a sore throat and either history of fever >100.4 in the previous 24 hours or recent exposure to a known case of strep throat? Yes  Does the patient have a productive cough that started within the past 7 days? No

## 2025-07-13 NOTE — PATIENT INSTRUCTIONS
July 13, 2025 Urgent Care Plan      -Home supportive care and observant management   -Ok to alternate over the counter doses of Ibuprofen 400 mg and Tylenol 650 mg  (switch from one to the other every 4 hours) for the next couple of days, as needed for sore throat and fever  -Encourage extra fluids   -Follow-up if you are not all better in 7-10 days, and sooner if sudden new or worsening sympotms.     -Watch your MyChart for your second Strep Test result (called Strep PCR).  The second test result typically comes back between 4 hours and 24 hours.  If your second test shows Strep Throat, we will contact you and start you on an antibiotic.     -Watch your MyChart for your pending COVID PCR test result (this typically takes 24 to 36 hours).

## 2025-07-13 NOTE — PROGRESS NOTES
ASSESSMENT/PLAN:         MDM: Acute onset upper respiratory symptoms consistent with viral URI. Rapid Strep is negative. Strep and Covid PCR test results pending. No evidence of secondary bacterial infection on exam. No evidence of respiratory distress or other medical distress requiring emergent intervention at this time.  Please see below discharge summary/plan (which was reviewed with patient today verbally and provided in MyChart for home review).          AVS/Plan:     July 13, 2025 Urgent Care Plan      -Home supportive care and observant management   -Ok to alternate over the counter doses of Ibuprofen 400 mg and Tylenol 650 mg  (switch from one to the other every 4 hours) for the next couple of days, as needed for sore throat and fever  -Encourage extra fluids   -Follow-up if you are not all better in 7-10 days, and sooner if sudden new or worsening sympotms.     -Watch your MyChart for your second Strep Test result (called Strep PCR).  The second test result typically comes back between 4 hours and 24 hours.  If your second test shows Strep Throat, we will contact you and start you on an antibiotic.     -Watch your MyChart for your pending COVID PCR test result (this typically takes 24 to 36 hours).          (J06.9) Viral URI with cough  (primary encounter diagnosis)  Plan: COVID-19 Virus (Coronavirus) by PCR Nose            (R07.0) Throat pain  Plan: Streptococcus A Rapid Screen w/Reflex to PCR -         Clinic Collect, Group A Streptococcus PCR         Throat Swab, COVID-19 Virus (Coronavirus) by         PCR Nose            (R09.81) Nasal congestion  Plan: COVID-19 Virus (Coronavirus) by PCR Nose      (R05.1) Acute cough      (R52) Generalized body aches  Plan: COVID-19 Virus (Coronavirus) by PCR Nose            (R51.9) Generalized headaches  Plan: COVID-19 Virus (Coronavirus) by PCR Nose             This progress note has been dictated, with use of voice recognition software. Any grammatical,  "typographical, or context errors are unintentional and inherent to use of voice recognition software.  ------------        Chief Complaint   Patient presents with    Pharyngitis     Yesterday, sinus pressure/congestion/nausea       SUBJECTIVE:     Nevaeh Zavala a 39 year old female  who presents to  today for evaluation of acute onset nasal sinus congestion, clear runny nose, sore throat, waxing and waning generalized headache, waxing and waning generalized body ache, intermittent nausea (no vomiting or diarrhea) and decreased appetite x approximately 36 hours duration.   Patient confirms they are still able to take in good fluids and soft food despite sore throat.      Illness Contact: No known specific close contact illness exposure          ROS: Positive as per above.  She  CONSITUTIONAL: No fever or chills. Positive malaise. No severe fatigue  HEENT:  P \"okay\"ositive as per above.   RESP: Positive for cough-described as mild and intermittent. No associated wheezing, hemoptysis, or shortness of breath.   GI: No vomiting or diarrhea.  No abdominal pain.   SKIN: No acute rash or hives    NEURO: Positive for waxing and waning generalized headache.  No severe headaches, neck stiffness, photophobia, rash, mental status changes or lethargy.     Past Medical History:   Diagnosis Date    Anxiety 08/02/2022    Benign essential hypertension 08/02/2022    Family history of ischemic heart disease 08/02/2022    Family history of muscular dystrophy 08/02/2022       Patient Active Problem List   Diagnosis    Benign essential hypertension    Family history of ischemic heart disease    Family history of muscular dystrophy    Class 2 severe obesity with serious comorbidity and body mass index (BMI) of 38.0 to 38.9 in adult, unspecified obesity type (H)     Current Outpatient Medications   Medication Sig Dispense Refill    Prenatal MV & Min w/FA-DHA (ONE A DAY PRENATAL PO)  (Patient not taking: Reported on 7/13/2025)   "     No current facility-administered medications for this visit.           No Known Allergies     OBJECTIVE:  /87 (BP Location: Right arm, Patient Position: Sitting, Cuff Size: Adult Regular)   Pulse 107   Temp 98.7  F (37.1  C) (Tympanic)   Resp 16   Wt 110.2 kg (243 lb)   LMP 06/22/2025   SpO2 97%   Breastfeeding No   BMI 39.52 kg/m              General appearance: alert and no apparent distress  Skin color is uniform in color and without systemic hives or rash.  HEENT:   Conjunctiva not injected.  Sclera clear.  Left TM is normal: no effusions, no erythema, and normal landmarks.  Right TM is normal: no effusions, no erythema, and normal landmarks.  Nasal mucosa is congested with clear rhinorrhea noted   Oropharyngeal exam is positive for mild, generalized, posterior pharyngeal erythema and postnasal drip.  No asymmetry. Uvula is midline. No trismus. Voice is clear. No lesions, adenopathy, plaque or exudate.  NECK: Trachea is midline. Neck is supple, FROM. No neck stiffness. No adenopathy  CARDIAC:NORMAL - regular rate and rhythm without murmur.  RESP: No increased work of breathing. No retractions. No stridor. Lung fields are clear to ausculation. No rales, rhonchi, or wheezing.  NEURO: Alert and oriented.  Normal speech and mentation.  CN II/XII grossly intact.  Gait within normal limits.          LAB:      Results for orders placed or performed in visit on 07/13/25   Streptococcus A Rapid Screen w/Reflex to PCR - Clinic Collect     Status: Normal    Specimen: Throat; Swab   Result Value Ref Range    Group A Strep antigen Negative Negative         Strep PCR test result is pending      Covid-19 PCR: Patient is pending

## 2025-07-14 LAB
S PYO DNA THROAT QL NAA+PROBE: NOT DETECTED
SARS-COV-2 RNA RESP QL NAA+PROBE: POSITIVE

## 2025-07-15 ENCOUNTER — TELEPHONE (OUTPATIENT)
Dept: NURSING | Facility: CLINIC | Age: 40
End: 2025-07-15
Payer: COMMERCIAL

## 2025-07-15 NOTE — TELEPHONE ENCOUNTER
Patient Returning Call    Reason for call:  Lab result    Information relayed to patient:  She is positive for Covid    Patient has additional questions:  No      Could we send this information to you in [facility] or would you prefer to receive a phone call?:   No preference   Okay to leave a detailed message?: N/A     Pt is not interested in treatment at this time.  Pretty mild, very manageable.    Tylenol seems to be doing the trick.      Did inform patient there is a time limit to when you can get treatment based on when your symptoms started, pt still declines at this time.  Pt voiced understanding and agreeable to plan.  Will call the clinic if any other questions or concerns arise.        Shanta Yu RN BSN  Clinic Nurse  [facility]

## 2025-07-15 NOTE — TELEPHONE ENCOUNTER
Patient classified as COVID treatment eligible by Epic high risk algorithm:  Yes    Coronavirus (COVID-19) Notification    Reason for call  Notify of POSITIVE COVID-19 lab result, assess symptoms,  review [facility] recommendations    Lab Result   Lab test for 2019-nCoV rRt-PCR or SARS-COV-2 PCR  Oropharyngeal AND/OR nasopharyngeal swabs were POSITIVE for 2019-nCoV RNA [OR] SARS-COV-2 RNA (COVID-19) RNA     We have been unable to reach patient by phone at this time to notify of their Positive COVID-19 result.    NO , COULD NOT [facility] voicemail message requesting a call back to [phone] [facility] for results.        A Positive COVID-19 letter will be sent via [employer] or the mail.    Jacque Rubio

## 2025-07-21 ENCOUNTER — OFFICE VISIT (OUTPATIENT)
Dept: FAMILY MEDICINE | Facility: CLINIC | Age: 40
End: 2025-07-21
Payer: COMMERCIAL

## 2025-07-21 VITALS
BODY MASS INDEX: 39.26 KG/M2 | HEIGHT: 66 IN | WEIGHT: 244.3 LBS | OXYGEN SATURATION: 97 % | SYSTOLIC BLOOD PRESSURE: 137 MMHG | HEART RATE: 85 BPM | RESPIRATION RATE: 16 BRPM | DIASTOLIC BLOOD PRESSURE: 84 MMHG

## 2025-07-21 DIAGNOSIS — J02.9 SORE THROAT: Primary | ICD-10-CM

## 2025-07-21 PROCEDURE — 3079F DIAST BP 80-89 MM HG: CPT | Performed by: PHYSICIAN ASSISTANT

## 2025-07-21 PROCEDURE — G2211 COMPLEX E/M VISIT ADD ON: HCPCS | Performed by: PHYSICIAN ASSISTANT

## 2025-07-21 PROCEDURE — 99213 OFFICE O/P EST LOW 20 MIN: CPT | Performed by: PHYSICIAN ASSISTANT

## 2025-07-21 PROCEDURE — 1125F AMNT PAIN NOTED PAIN PRSNT: CPT | Performed by: PHYSICIAN ASSISTANT

## 2025-07-21 PROCEDURE — 3075F SYST BP GE 130 - 139MM HG: CPT | Performed by: PHYSICIAN ASSISTANT

## 2025-07-21 ASSESSMENT — PAIN SCALES - GENERAL: PAINLEVEL_OUTOF10: MODERATE PAIN (5)

## 2025-07-21 ASSESSMENT — ENCOUNTER SYMPTOMS: SORE THROAT: 1

## 2025-07-21 NOTE — PROGRESS NOTES
"  Assessment & Plan     Sore throat:  - Sore throat likely related to recent COVID infection.  This strain apparently has a significant sore throat as part of the symptoms.  Persistent sore throat may be due to drainage down the back of the throat or allergies. Afebrile reported.  - Continue taking ibuprofen for pain management. Monitor symptoms and send a message if sinus pain or worsening ST develops.    Consent was obtained from the patient to use an AI documentation tool in the creation of this note.    BMI  Estimated body mass index is 39.43 kg/m  as calculated from the following:    Height as of this encounter: 1.676 m (5' 6\").    Weight as of this encounter: 110.8 kg (244 lb 4.8 oz).   Weight management plan: Patient was referred to their PCP to discuss a diet and exercise plan.    The longitudinal plan of care for the diagnosis(es)/condition(s) as documented were addressed during this visit. Due to the added complexity in care, I will continue to support Joanie in the subsequent management and with ongoing continuity of care.        Parveen Nair is a 39 year old, presenting for the following health issues:  Pharyngitis        7/21/2025     2:14 PM   Additional Questions   Roomed by Deysi SPAIN MA   Accompanied by self         7/21/2025     2:14 PM   Patient Reported Additional Medications   Patient reports taking the following new medications none     Pharyngitis     History of Present Illness       Reason for visit:  Sore throat    She eats 2-3 servings of fruits and vegetables daily.She consumes 2 sweetened beverage(s) daily.She exercises with enough effort to increase her heart rate 30 to 60 minutes per day.  She exercises with enough effort to increase her heart rate 3 or less days per week.   She is taking medications regularly.      Sore throat since testing positive for covid on 7/13 at urgent care. Has tried ibuprofen, tylenol and cough drops for sore throat which does give some relief     - " "Recently diagnosed with COVID-19 via PCR test at  last week, despite negative results from multiple home tests.  - Sore throat persisting for 9-10 days, initially suspected strep throat but tested negative.  - Experienced burning sensation in sinuses and ear pressure, which improved with hot steamy showers.  - Noticed sore throat and ear pressure upon waking, which would drain and improve.  - Using ibuprofen and Tylenol for pain relief, which helps but does not completely alleviate symptoms.  - No fever reported during the illness.  - Increased snoring noted, possibly aggravating throat symptoms.          Objective    /84 (BP Location: Right arm, Patient Position: Sitting, Cuff Size: Adult Large)   Pulse 85   Resp 16   Ht 1.676 m (5' 6\")   Wt 110.8 kg (244 lb 4.8 oz)   LMP 06/22/2025   SpO2 97%   BMI 39.43 kg/m    Body mass index is 39.43 kg/m .  Physical Exam   GENERAL: alert and no distress  HENT: ear canals and TM's normal, nose and mouth without ulcers or lesions  NECK: no adenopathy, no asymmetry, masses, or scars  RESP: lungs clear to auscultation - no rales, rhonchi or wheezes  CV: regular rate and rhythm, normal S1 S2, no S3 or S4, no murmur, click or rub, no peripheral edema  MS: no gross musculoskeletal defects noted, no edema  PSYCH: mentation appears normal, affect normal/bright            Signed Electronically by: Fabricio Partida PA-C    "